# Patient Record
Sex: FEMALE | Race: WHITE | NOT HISPANIC OR LATINO | Employment: FULL TIME | URBAN - NONMETROPOLITAN AREA
[De-identification: names, ages, dates, MRNs, and addresses within clinical notes are randomized per-mention and may not be internally consistent; named-entity substitution may affect disease eponyms.]

---

## 2018-01-31 ENCOUNTER — HOSPITAL ENCOUNTER (EMERGENCY)
Facility: HOSPITAL | Age: 28
Discharge: HOME | End: 2018-01-31
Attending: STUDENT IN AN ORGANIZED HEALTH CARE EDUCATION/TRAINING PROGRAM | Admitting: STUDENT IN AN ORGANIZED HEALTH CARE EDUCATION/TRAINING PROGRAM

## 2018-01-31 VITALS
TEMPERATURE: 98.9 F | DIASTOLIC BLOOD PRESSURE: 80 MMHG | RESPIRATION RATE: 18 BRPM | HEIGHT: 64 IN | BODY MASS INDEX: 26.12 KG/M2 | WEIGHT: 153 LBS | SYSTOLIC BLOOD PRESSURE: 118 MMHG | HEART RATE: 91 BPM | OXYGEN SATURATION: 99 %

## 2018-01-31 DIAGNOSIS — K11.20 SALIVARY GLAND INFLAMMATION: Primary | ICD-10-CM

## 2018-01-31 PROCEDURE — 99283 EMERGENCY DEPT VISIT LOW MDM: CPT

## 2018-01-31 RX ORDER — CLINDAMYCIN HYDROCHLORIDE 300 MG/1
300 CAPSULE ORAL 3 TIMES DAILY
Qty: 21 CAPSULE | Refills: 0 | Status: SHIPPED | OUTPATIENT
Start: 2018-01-31 | End: 2019-06-12

## 2018-01-31 RX ORDER — ACETAMINOPHEN 325 MG/1
650 TABLET ORAL ONCE
Status: COMPLETED | OUTPATIENT
Start: 2018-01-31 | End: 2018-01-31

## 2018-01-31 RX ORDER — SENNOSIDES 8.6 MG
650 CAPSULE ORAL EVERY 8 HOURS PRN
Qty: 12 TABLET | Refills: 0 | Status: SHIPPED | OUTPATIENT
Start: 2018-01-31 | End: 2019-06-12

## 2018-01-31 RX ADMIN — ACETAMINOPHEN 650 MG: 325 TABLET, FILM COATED ORAL at 12:45

## 2019-06-12 ENCOUNTER — OFFICE VISIT (OUTPATIENT)
Dept: INTERNAL MEDICINE | Facility: CLINIC | Age: 29
End: 2019-06-12

## 2019-06-12 VITALS
RESPIRATION RATE: 17 BRPM | WEIGHT: 166 LBS | DIASTOLIC BLOOD PRESSURE: 72 MMHG | HEIGHT: 64 IN | SYSTOLIC BLOOD PRESSURE: 106 MMHG | TEMPERATURE: 97.7 F | BODY MASS INDEX: 28.34 KG/M2 | HEART RATE: 92 BPM | OXYGEN SATURATION: 98 %

## 2019-06-12 DIAGNOSIS — J02.9 SORE THROAT: ICD-10-CM

## 2019-06-12 DIAGNOSIS — J40 BRONCHITIS: Primary | ICD-10-CM

## 2019-06-12 DIAGNOSIS — J02.9 PHARYNGITIS, UNSPECIFIED ETIOLOGY: ICD-10-CM

## 2019-06-12 LAB
EXPIRATION DATE: NORMAL
INTERNAL CONTROL: NORMAL
Lab: NORMAL
S PYO RRNA THROAT QL PROBE: NEGATIVE

## 2019-06-12 PROCEDURE — 87651 STREP A DNA AMP PROBE: CPT | Performed by: PHYSICIAN ASSISTANT

## 2019-06-12 PROCEDURE — 99203 OFFICE O/P NEW LOW 30 MIN: CPT | Performed by: PHYSICIAN ASSISTANT

## 2019-06-12 RX ORDER — ETHINYL ESTRADIOL/DROSPIRENONE 0.02-3(28)
0.02 TABLET ORAL DAILY
COMMUNITY
Start: 2019-04-09 | End: 2021-03-08

## 2019-06-12 RX ORDER — DEXTROMETHORPHAN HYDROBROMIDE AND PROMETHAZINE HYDROCHLORIDE 15; 6.25 MG/5ML; MG/5ML
5 SYRUP ORAL NIGHTLY PRN
Qty: 118 ML | Refills: 0 | Status: SHIPPED | OUTPATIENT
Start: 2019-06-12 | End: 2021-03-08

## 2019-06-12 RX ORDER — CEFDINIR 300 MG/1
300 CAPSULE ORAL 2 TIMES DAILY
Qty: 20 CAPSULE | Refills: 0 | Status: SHIPPED | OUTPATIENT
Start: 2019-06-12 | End: 2019-06-22

## 2019-06-12 NOTE — PROGRESS NOTES
Chief Complaint   Patient presents with   • Cough     patient states symptoms started 3 days ago with sore throat, patient then states she began having a dry cough and drainage yesterday 06/11/2019   • Establish Care       Subjective   Uyen Howell is a 28 y.o. female presents today to establish care.  She has c/o cough and sore throat.    History of Present Illness     Patient reports that symptoms began a several days ago with sore throat.  The symptoms worsened last night and she developed cough that is dry with associated post nasal drainage.  She denies any fever, chills.  She has had some body aches.  She has tightness in her chest and reports that it is difficult to take a deep breath.  Denies any shortness of breath or chest pain.  She has been using over the counter cold medication as well as salt water gargles for the sore throat.  She feels that her symptoms are worsening.  They have caused sleep disturbance.    Past Medical History:   Diagnosis Date   • Allergic      Past Surgical History:   Procedure Laterality Date   • EYE SURGERY     • SALIVARY GLAND SURGERY  2018    Patient states it was a surgery to remove salivary stones.     Family History   Problem Relation Age of Onset   • Other Mother    • Diabetes Father    • Arthritis Maternal Grandmother    • Cancer Maternal Grandfather    • Stroke Maternal Grandfather    • Other Paternal Grandmother    • Arthritis Paternal Grandmother    • Diabetes Paternal Grandfather    • Heart disease Paternal Grandfather      Social History     Socioeconomic History   • Marital status: Single     Spouse name: Not on file   • Number of children: Not on file   • Years of education: Not on file   • Highest education level: Not on file   Tobacco Use   • Smoking status: Former Smoker     Packs/day: 0.00     Types: Cigarettes     Last attempt to quit: 6/9/2019   • Smokeless tobacco: Never Used   Substance and Sexual Activity   • Alcohol use: No   • Drug use: No   • Sexual  activity: Yes     Allergies   Allergen Reactions   • Biaxin [Clarithromycin] Swelling   • Amoxicillin Rash     Review of Systems   Constitutional: Positive for appetite change and fatigue. Negative for chills and fever.   HENT: Positive for congestion, postnasal drip, rhinorrhea, sore throat and voice change. Negative for ear pain, sinus pressure and sneezing.    Respiratory: Positive for cough and chest tightness. Negative for shortness of breath.    Cardiovascular: Negative for chest pain.   Gastrointestinal: Positive for nausea. Negative for abdominal pain, diarrhea and vomiting.   Musculoskeletal: Positive for arthralgias.     Objective     Vitals:    06/12/19 0906   BP: 106/72   Pulse: 92   Resp: 17   Temp: 97.7 °F (36.5 °C)   SpO2: 98%       Physical Exam   Constitutional: She is oriented to person, place, and time. She appears well-developed and well-nourished. No distress.   HENT:   Head: Normocephalic and atraumatic.   Right Ear: External ear and ear canal normal. Tympanic membrane is not erythematous, not retracted and not bulging. A middle ear effusion is present.   Left Ear: External ear and ear canal normal. Tympanic membrane is not erythematous, not retracted and not bulging. A middle ear effusion is present.   Nose: Congestion present. Right sinus exhibits no maxillary sinus tenderness and no frontal sinus tenderness. Left sinus exhibits no maxillary sinus tenderness and no frontal sinus tenderness.   Mouth/Throat: Uvula is midline. Posterior oropharyngeal erythema present.   Clear mild bilateral effusion noted, no erythema.   White nodule noted to the right of the posterior oropharynx.    Eyes: Conjunctivae and EOM are normal. Pupils are equal, round, and reactive to light.   Neck: Normal range of motion. Neck supple.   Cardiovascular: Normal rate, regular rhythm and normal heart sounds. Exam reveals no gallop and no friction rub.   No murmur heard.  Pulmonary/Chest: Effort normal and breath sounds  normal. No respiratory distress. She has no wheezes. She has no rales.   Neurological: She is alert and oriented to person, place, and time.   Skin: Skin is warm and dry. Capillary refill takes less than 2 seconds. She is not diaphoretic.   Psychiatric: She has a normal mood and affect. Her behavior is normal.   Nursing note and vitals reviewed.      Results for orders placed or performed in visit on 06/12/19   POCT Strep A, molecular   Result Value Ref Range    POC Strep A, Molecular Negative Negative    Internal Control Passed Passed    Lot Number [id number]     Expiration Date 03-        Assessment/Plan     Uyen was seen today for cough and establish care.    Diagnoses and all orders for this visit:    Bronchitis  -     cefdinir (OMNICEF) 300 MG capsule; Take 1 capsule by mouth 2 (Two) Times a Day for 10 days.  -     promethazine-dextromethorphan (PROMETHAZINE-DM) 6.25-15 MG/5ML syrup; Take 5 mL by mouth At Night As Needed for Cough.    Sore throat  -     POCT Strep A, molecular    Pharyngitis, unspecified etiology    Strep negative.  Start cefdinir.  Discontinue phenylephrine.  Start zyrtec/flonase for mid ear effusion.  Cough syrup as needed at night.  Can rotate tylenol/motrin for additional symptom relief.  Continue to use salt water gargles, push fluids, and rest.  White nodule to oropharynx most likely tonsil stone.  Have advised patient that if this persists to RTC.  Patient reports that she has ENT doctor and will make an appointment with them if the nodule is persistent.     Return if symptoms worsen or fail to improve.    Erin Guerrero PA-C

## 2019-06-18 ENCOUNTER — PROCEDURE VISIT (OUTPATIENT)
Dept: OBSTETRICS AND GYNECOLOGY | Facility: CLINIC | Age: 29
End: 2019-06-18

## 2019-06-18 VITALS
BODY MASS INDEX: 28.89 KG/M2 | WEIGHT: 169.2 LBS | DIASTOLIC BLOOD PRESSURE: 70 MMHG | SYSTOLIC BLOOD PRESSURE: 106 MMHG | HEIGHT: 64 IN

## 2019-06-18 DIAGNOSIS — Z12.4 SCREENING FOR MALIGNANT NEOPLASM OF CERVIX: ICD-10-CM

## 2019-06-18 DIAGNOSIS — Z01.419 ENCOUNTER FOR GYNECOLOGICAL EXAMINATION WITHOUT ABNORMAL FINDING: Primary | ICD-10-CM

## 2019-06-18 PROCEDURE — 99395 PREV VISIT EST AGE 18-39: CPT | Performed by: PHYSICIAN ASSISTANT

## 2019-06-18 NOTE — PROGRESS NOTES
Subjective   Chief Complaint   Patient presents with   • Gynecologic Exam     Last pap done in 2016, No complaints       Uyen Howell is a 28 y.o. year old  presenting to be seen for her annual gynecological exam.   She has no complaints  She has been on Gissell oc's for several years and desires to stop oc's to see if hormones contributing to depression symptoms. States she will use condoms  Patient's last menstrual period was 2019 (exact date).       Past Medical History:   Diagnosis Date   • Allergic    • Anxiety    • Asthma    • Chlamydia    • Depression    • Migraine    • Urinary tract infection         Current Outpatient Medications:   •  JOHN 3-0.02 MG per tablet, Take 0.02 mg by mouth Daily., Disp: , Rfl:   •  cefdinir (OMNICEF) 300 MG capsule, Take 1 capsule by mouth 2 (Two) Times a Day for 10 days., Disp: 20 capsule, Rfl: 0  •  promethazine-dextromethorphan (PROMETHAZINE-DM) 6.25-15 MG/5ML syrup, Take 5 mL by mouth At Night As Needed for Cough., Disp: 118 mL, Rfl: 0   Allergies   Allergen Reactions   • Biaxin [Clarithromycin] Swelling   • Amoxicillin Rash      Past Surgical History:   Procedure Laterality Date   • EYE SURGERY     • SALIVARY GLAND SURGERY  2018    Patient states it was a surgery to remove salivary stones.      Social History     Socioeconomic History   • Marital status: Single     Spouse name: Not on file   • Number of children: Not on file   • Years of education: Not on file   • Highest education level: Not on file   Tobacco Use   • Smoking status: Former Smoker     Packs/day: 0.00     Types: Cigarettes     Last attempt to quit: 2019     Years since quittin.0   • Smokeless tobacco: Never Used   Substance and Sexual Activity   • Alcohol use: No   • Drug use: No   • Sexual activity: Yes     Partners: Male     Birth control/protection: OCP      Family History   Problem Relation Age of Onset   • Other Mother    • Diabetes Father    • Arthritis Maternal Grandmother    • Cancer  "Maternal Grandfather    • Stroke Maternal Grandfather    • Other Paternal Grandmother    • Arthritis Paternal Grandmother    • Diabetes Paternal Grandfather    • Heart disease Paternal Grandfather        Review of Systems   Constitutional:        Weight gain   Gastrointestinal: Negative.    Genitourinary: Negative.    Hematological: Bruises/bleeds easily.   Psychiatric/Behavioral: Positive for dysphoric mood.   All other systems reviewed and are negative.          Objective   /70   Ht 162.6 cm (64\")   Wt 76.7 kg (169 lb 3.2 oz)   LMP 06/04/2019 (Exact Date)   Breastfeeding? No   BMI 29.04 kg/m²     Physical Exam   Constitutional: She appears well-developed and well-nourished. She is cooperative. No distress.   Pulmonary/Chest: Right breast exhibits no inverted nipple, no mass, no nipple discharge, no skin change and no tenderness. Left breast exhibits no inverted nipple, no mass, no nipple discharge, no skin change and no tenderness.   Abdominal: Soft. Normal appearance. There is no tenderness.   Genitourinary: Vagina normal and uterus normal. There is no tenderness or lesion on the right labia. There is no tenderness or lesion on the left labia. Cervix exhibits no motion tenderness and no discharge. Right adnexum displays no mass and no tenderness. Left adnexum displays no mass and no tenderness.   Genitourinary Comments: Pap done   Neurological: She is alert.   Skin: Skin is warm and dry.   Psychiatric: She has a normal mood and affect. Her behavior is normal.            Assessment and Plan  Uyen was seen today for gynecologic exam.    Diagnoses and all orders for this visit:    Encounter for gynecological examination without abnormal finding    Screening for malignant neoplasm of cervix  -     Liquid-based Pap Smear, Screening; Future      Patient Instructions   Encourage self breast exam monthly  Regular excercise             This note was electronically signed.    Antonina Laughlin PA-C   June 18, " 2019

## 2019-06-25 DIAGNOSIS — Z12.4 SCREENING FOR MALIGNANT NEOPLASM OF CERVIX: ICD-10-CM

## 2019-06-25 RX ORDER — FLUCONAZOLE 150 MG/1
TABLET ORAL
Qty: 2 TABLET | Refills: 0 | Status: SHIPPED | OUTPATIENT
Start: 2019-06-25 | End: 2021-03-08

## 2021-03-08 ENCOUNTER — OFFICE VISIT (OUTPATIENT)
Dept: INTERNAL MEDICINE | Facility: CLINIC | Age: 31
End: 2021-03-08

## 2021-03-08 VITALS
HEART RATE: 104 BPM | OXYGEN SATURATION: 99 % | TEMPERATURE: 99.6 F | SYSTOLIC BLOOD PRESSURE: 110 MMHG | DIASTOLIC BLOOD PRESSURE: 70 MMHG | HEIGHT: 64 IN | BODY MASS INDEX: 33.29 KG/M2 | WEIGHT: 195 LBS

## 2021-03-08 DIAGNOSIS — B07.0 PLANTAR WART OF RIGHT FOOT: ICD-10-CM

## 2021-03-08 DIAGNOSIS — Z13.39 ADHD (ATTENTION DEFICIT HYPERACTIVITY DISORDER) EVALUATION: ICD-10-CM

## 2021-03-08 DIAGNOSIS — Z11.59 ENCOUNTER FOR HEPATITIS C SCREENING TEST FOR LOW RISK PATIENT: ICD-10-CM

## 2021-03-08 DIAGNOSIS — Z76.89 ENCOUNTER TO ESTABLISH CARE: Primary | ICD-10-CM

## 2021-03-08 DIAGNOSIS — Z13.0 SCREENING FOR ENDOCRINE/METABOLIC/IMMUNITY DISORDERS: ICD-10-CM

## 2021-03-08 DIAGNOSIS — Z13.29 SCREENING FOR ENDOCRINE/METABOLIC/IMMUNITY DISORDERS: ICD-10-CM

## 2021-03-08 DIAGNOSIS — Z13.228 SCREENING FOR ENDOCRINE/METABOLIC/IMMUNITY DISORDERS: ICD-10-CM

## 2021-03-08 PROCEDURE — 99203 OFFICE O/P NEW LOW 30 MIN: CPT | Performed by: NURSE PRACTITIONER

## 2021-03-08 NOTE — PROGRESS NOTES
"Date: 2021    Name: Uyen Howell  : 1990    Chief Complaint:   Chief Complaint   Patient presents with   • ADHD     pt is wanting to discuss medication, wart on bottom right foot       HPI:  Uyen Howell is a 30 y.o. female presents to [facility].  She has complaints of a lesion on the bottom of her right foot that has been present for over a year. She has using OTC solution that is brushed on warts for the past week.  Lesion is not improved.  It is becoming painful when she walks.  Does not recall injury to foot.  No swelling, bleeding, itching, drainage from lesion.    She is concerned she may have ADHD. Unable to pay attention for long periods of time, unable to focus.  Has worsened over the past year.  She is going to Law School at .  Unable to finish projects, procrastinates and then finishes under pressure at the last moment.  She feels she cannot sit still.  Always shifting, rocking, always has something in her hands to play with.  Mom has ADHD, takes medication for it.  She finds her self impatiently waiting for others to finish her sentences. Occasionally has difficulty organizing tasks/activities.  Is easily distracted.      LMP: 21  Last pap: 2019  Abnormal pap: no  : no  Para: no  Menarche: 5 th grade    Supplements: no        History: The following portions of the patient's history were reviewed and updated as appropriate: allergies, current medications, past medical history, family history, surgical history, social history and problem list.      ROS:  Review of Systems   All other systems reviewed and are negative.      VS:  Vitals:    21 1658   BP: 110/70   Pulse: 104   Temp: 99.6 °F (37.6 °C)   TempSrc: Infrared   SpO2: 99%   Weight: 88.5 kg (195 lb)   Height: 162.6 cm (64\")     Body mass index is 33.47 kg/m².  PE:  Physical Exam  Constitutional:       Appearance: She is not ill-appearing.   HENT:      Head: Normocephalic.      Right Ear: External ear normal.      " Left Ear: External ear normal.   Eyes:      Conjunctiva/sclera: Conjunctivae normal.      Pupils: Pupils are equal, round, and reactive to light.   Cardiovascular:      Rate and Rhythm: Normal rate and regular rhythm.      Pulses: Normal pulses.      Heart sounds: Normal heart sounds.   Pulmonary:      Effort: Pulmonary effort is normal.      Breath sounds: Normal breath sounds.   Musculoskeletal:      Cervical back: Normal range of motion and neck supple.      Comments: Plantar wart on plantar surface, right foot.  Slightly tender to touch.  Skin surrounding wart is hypopigmented, dry.     Skin:     General: Skin is warm.      Capillary Refill: Capillary refill takes less than 2 seconds.   Neurological:      Mental Status: She is alert and oriented to person, place, and time.      Coordination: Coordination normal.      Gait: Gait normal.   Psychiatric:         Attention and Perception: Attention normal.         Mood and Affect: Mood is anxious.         Speech: Speech normal.         Behavior: Behavior normal.         Thought Content: Thought content normal.         Assessment/Plan:  Diagnoses and all orders for this visit:    1. Encounter to establish care (Primary)    2. Plantar wart of right foot  -     Ambulatory Referral to Podiatry  - Advised to stop using OTC treatment  - Wear shoes with good support, cushioned sole    3. ADHD (attention deficit hyperactivity disorder) evaluation  -     Ambulatory Referral to Psychiatry    4. Encounter for hepatitis C screening test for low risk patient  -     Hepatitis C Antibody    5. Screening for endocrine/metabolic/immunity disorders  -     Comprehensive Metabolic Panel  -     TSH    Return for Annual.

## 2021-03-15 ENCOUNTER — OFFICE VISIT (OUTPATIENT)
Dept: INTERNAL MEDICINE | Facility: CLINIC | Age: 31
End: 2021-03-15

## 2021-03-15 VITALS
DIASTOLIC BLOOD PRESSURE: 72 MMHG | BODY MASS INDEX: 33.97 KG/M2 | OXYGEN SATURATION: 99 % | SYSTOLIC BLOOD PRESSURE: 115 MMHG | HEIGHT: 64 IN | WEIGHT: 199 LBS | HEART RATE: 77 BPM | TEMPERATURE: 98.7 F | RESPIRATION RATE: 16 BRPM

## 2021-03-15 DIAGNOSIS — R41.840 ATTENTION DEFICIT: Primary | ICD-10-CM

## 2021-03-15 DIAGNOSIS — Z79.899 HIGH RISK MEDICATION USE: ICD-10-CM

## 2021-03-15 PROCEDURE — 99214 OFFICE O/P EST MOD 30 MIN: CPT | Performed by: FAMILY MEDICINE

## 2021-03-15 RX ORDER — METHYLPHENIDATE HYDROCHLORIDE 18 MG/1
18 TABLET ORAL EVERY MORNING
Qty: 30 TABLET | Refills: 0 | Status: SHIPPED | OUTPATIENT
Start: 2021-03-15 | End: 2021-04-12

## 2021-03-15 NOTE — PROGRESS NOTES
"Subjective    Uyen Howell is a 30 y.o. female here for:  Chief Complaint   Patient presents with   • Mental Health Problem     has trouble focusing, concentrating on things.  Mom has Adult ADHD and thinks she may have.  Therapist also recommends.       History per MA reviewed.    Per office note 3/8/21:    \"She is concerned she may have ADHD. Unable to pay attention for long periods of time, unable to focus.  Has worsened over the past year.  She is going to Law School at .  Unable to finish projects, procrastinates and then finishes under pressure at the last moment.  She feels she cannot sit still.  Always shifting, rocking, always has something in her hands to play with.  Mom has ADHD, takes medication for it.  She finds her self impatiently waiting for others to finish her sentences. Occasionally has difficulty organizing tasks/activities.  Is easily distracted.\"    In first year of law school. Mom takes Concerta and it works well for her. No known heart issues in patient.          The following portions of the patient's history were reviewed and updated as appropriate: allergies, current medications, past family history, past medical history, past social history, past surgical history and problem list.    Review of Systems   Constitutional: Negative for fever.   Cardiovascular: Negative for chest pain and palpitations.   Psychiatric/Behavioral: Positive for decreased concentration and positive for hyperactivity.       Visit Vitals  /72   Pulse 77   Temp 98.7 °F (37.1 °C)   Resp 16   Ht 162.6 cm (64\")   Wt 90.3 kg (199 lb)   SpO2 99%   BMI 34.16 kg/m²         Objective   Physical Exam  Vitals and nursing note reviewed.   Constitutional:       General: She is not in acute distress.     Appearance: Normal appearance. She is well-developed and well-groomed. She is obese. She is not ill-appearing, toxic-appearing or diaphoretic.      Interventions: Face mask in place.   HENT:      Head: Normocephalic and " atraumatic.      Right Ear: Hearing normal.      Left Ear: Hearing normal.   Eyes:      General: Lids are normal. No scleral icterus.     Extraocular Movements: Extraocular movements intact.   Neck:      Trachea: Phonation normal.   Cardiovascular:      Rate and Rhythm: Normal rate and regular rhythm.   Pulmonary:      Effort: Pulmonary effort is normal.   Skin:     Coloration: Skin is not jaundiced.   Neurological:      General: No focal deficit present.      Mental Status: She is alert and oriented to person, place, and time.      Motor: Motor function is intact.   Psychiatric:         Attention and Perception: Attention and perception normal.         Mood and Affect: Mood and affect normal.         Speech: Speech normal.         Behavior: Behavior is hyperactive. Behavior is cooperative.         Thought Content: Thought content normal.         Cognition and Memory: Cognition and memory normal.         Judgment: Judgment normal.           Assessment/Plan     Problem List Items Addressed This Visit     None      Visit Diagnoses     Attention deficit    -  Primary    Relevant Medications    methylphenidate (Concerta) 18 MG CR tablet    Other Relevant Orders    Compliance Drug Analysis, Ur - Urine, Clean Catch    High risk medication use        Relevant Orders    Compliance Drug Analysis, Ur - Urine, Clean Catch          · KARLEE reviewed and appropriate.  Reviewed adult ADHD screener (see scanned media), supportive of add/adhd diagnosis. Continue seeing therapist. Trial of Concerta. If insurance does not cover may try ritalin or Adderall. Follow up in a month. Provided copy of drug contract for review. UDS obtained today. No red flags. Sounds as though she could really benefit from therapy.    Return in about 4 weeks (around 4/12/2021) for Controlled Rx Follow Up.     Rosa M Cornelius MD

## 2021-03-19 ENCOUNTER — TELEPHONE (OUTPATIENT)
Dept: INTERNAL MEDICINE | Facility: CLINIC | Age: 31
End: 2021-03-19

## 2021-03-19 LAB — DRUGS UR: NORMAL

## 2021-03-19 NOTE — TELEPHONE ENCOUNTER
PATIENT STATES THAT HER INSURANCE COMPANY NEEDS A PA FOR [employer].  SHE CAN BE REACHED [phone]

## 2021-03-22 NOTE — TELEPHONE ENCOUNTER
Mar from [employer] called and stated she is working on the PA for Concerta. The brand name is preferred and the generic is non-formulary. Advised the brand name is ok  for this pt. She will process the PA for the brand name and fax a determination letter today.

## 2021-03-23 ENCOUNTER — PRIOR AUTHORIZATION (OUTPATIENT)
Dept: INTERNAL MEDICINE | Facility: CLINIC | Age: 31
End: 2021-03-23

## 2021-04-12 ENCOUNTER — OFFICE VISIT (OUTPATIENT)
Dept: INTERNAL MEDICINE | Facility: CLINIC | Age: 31
End: 2021-04-12

## 2021-04-12 VITALS
TEMPERATURE: 97.7 F | HEIGHT: 64 IN | SYSTOLIC BLOOD PRESSURE: 120 MMHG | DIASTOLIC BLOOD PRESSURE: 60 MMHG | BODY MASS INDEX: 33.8 KG/M2 | RESPIRATION RATE: 18 BRPM | WEIGHT: 198 LBS | HEART RATE: 92 BPM | OXYGEN SATURATION: 96 %

## 2021-04-12 DIAGNOSIS — R41.840 ATTENTION DEFICIT: Primary | ICD-10-CM

## 2021-04-12 PROCEDURE — 99213 OFFICE O/P EST LOW 20 MIN: CPT | Performed by: FAMILY MEDICINE

## 2021-04-12 RX ORDER — METHYLPHENIDATE HYDROCHLORIDE 27 MG/1
27 TABLET ORAL EVERY MORNING
Qty: 30 TABLET | Refills: 0 | Status: SHIPPED | OUTPATIENT
Start: 2021-04-12 | End: 2021-05-10

## 2021-04-12 NOTE — PROGRESS NOTES
"Subjective    Uyen Howell is a 30 y.o. female here for:  Chief Complaint   Patient presents with   • ADD     Pt states she has not really seen a difference in her attention since starting the concerta.        History per MA reviewed.    No side effects other than mild tachycardia first day  Doesn't feel it has helped much thinks dose increase may work         The following portions of the patient's history were reviewed and updated as appropriate: allergies, current medications, past family history, past medical history, past social history, past surgical history and problem list.    Review of Systems   Constitutional: Negative for fever.   Cardiovascular: Negative for chest pain and palpitations.   Psychiatric/Behavioral: Positive for decreased concentration.       Visit Vitals  /60   Pulse 92   Temp 97.7 °F (36.5 °C) (Temporal)   Resp 18   Ht 162.6 cm (64.02\")   Wt 89.8 kg (198 lb)   SpO2 96%   BMI 33.97 kg/m²         Objective   Physical Exam  Vitals and nursing note reviewed.   Constitutional:       General: She is not in acute distress.     Appearance: Normal appearance. She is well-developed and well-groomed. She is not ill-appearing, toxic-appearing or diaphoretic.      Interventions: Face mask in place.   HENT:      Head: Normocephalic and atraumatic.      Right Ear: Hearing normal.      Left Ear: Hearing normal.   Eyes:      General: Lids are normal. No scleral icterus.     Extraocular Movements: Extraocular movements intact.   Neck:      Trachea: Phonation normal.   Cardiovascular:      Rate and Rhythm: Normal rate and regular rhythm.      Heart sounds: Normal heart sounds.   Pulmonary:      Effort: Pulmonary effort is normal.   Skin:     Coloration: Skin is not jaundiced.   Neurological:      General: No focal deficit present.      Mental Status: She is alert and oriented to person, place, and time.      Motor: Motor function is intact.   Psychiatric:         Attention and Perception: Attention and " perception normal.         Mood and Affect: Mood and affect normal.         Speech: Speech normal.         Behavior: Behavior normal. Behavior is cooperative.         Thought Content: Thought content normal.         Cognition and Memory: Cognition and memory normal.         Judgment: Judgment normal.         For medical decision making review of the following was required:  Compliance Drug Analysis, Ur - Urine, Clean Catch (03/15/2021 15:25)      Assessment/Plan     Problem List Items Addressed This Visit     None      Visit Diagnoses     Attention deficit    -  Primary    Relevant Medications    methylphenidate (Concerta) 27 MG CR tablet          · Drug contract signed today, UDS last visit appropriate. Trying higher dose, if 27 mg doesn't seem to help can go up to 36 mg after couple of weeks (use remaining 18 mg doses). Notify office at least 48 hrs ahead of time regarding refill need, and needs to let me know which dose to send (27 mg or 36 mg). KARLEE reviewed and appropriate.      Return in about 3 months (around 7/12/2021) for Controlled Rx Follow Up, telehealth okay.     Rosa M Cornelius MD

## 2021-04-15 ENCOUNTER — TELEPHONE (OUTPATIENT)
Dept: INTERNAL MEDICINE | Facility: CLINIC | Age: 31
End: 2021-04-15

## 2021-04-15 NOTE — TELEPHONE ENCOUNTER
Caller: Uyen Howell    Relationship: Self    Best call back number: [phone]    What form or medical record are you requesting: PROOF OF DIAGNOSIS OF ADHD  Who is requesting this form or medical record from you: PATIENT    How would you like to receive the form or medical records (pick-up, mail, fax): PATIENT WILL     Timeframe paperwork needed: A.S.A.P

## 2021-04-15 NOTE — TELEPHONE ENCOUNTER
Did not realize pt had already been communicating with you and Dr. Cornelius, I put a letter in for this only, but she said more info was needed, there is a mychart message in that I have sent to Dr. Cornelius

## 2021-05-10 ENCOUNTER — PATIENT MESSAGE (OUTPATIENT)
Dept: INTERNAL MEDICINE | Facility: CLINIC | Age: 31
End: 2021-05-10

## 2021-05-10 DIAGNOSIS — R41.840 ATTENTION DEFICIT: Primary | ICD-10-CM

## 2021-05-10 RX ORDER — METHYLPHENIDATE HYDROCHLORIDE 36 MG/1
36 TABLET ORAL EVERY MORNING
Qty: 30 TABLET | Refills: 0 | Status: SHIPPED | OUTPATIENT
Start: 2021-05-10 | End: 2021-07-12

## 2021-05-10 NOTE — TELEPHONE ENCOUNTER
From: Uyen Howell  To: Rosa M Cornelius MD  Sent: 5/10/2021 11:22 AM EDT  Subject: Prescription Question    Hello Doctor Ashli,     I would like my next refill to be the higher mg we talked about, please. Thank you!     Sincerely,   Uyen Howell

## 2021-05-31 ENCOUNTER — PATIENT MESSAGE (OUTPATIENT)
Dept: INTERNAL MEDICINE | Facility: CLINIC | Age: 31
End: 2021-05-31

## 2021-05-31 DIAGNOSIS — R41.840 ATTENTION DEFICIT: Primary | ICD-10-CM

## 2021-06-02 RX ORDER — DEXTROAMPHETAMINE SACCHARATE, AMPHETAMINE ASPARTATE, DEXTROAMPHETAMINE SULFATE AND AMPHETAMINE SULFATE 2.5; 2.5; 2.5; 2.5 MG/1; MG/1; MG/1; MG/1
10 TABLET ORAL 2 TIMES DAILY
Qty: 60 TABLET | Refills: 0 | Status: SHIPPED | OUTPATIENT
Start: 2021-06-02 | End: 2021-07-08 | Stop reason: SDUPTHER

## 2021-06-02 NOTE — TELEPHONE ENCOUNTER
From: Uyen Howell  To: Rosa M Cornelius MD  Sent: 5/31/2021 7:35 PM EDT  Subject: Prescription Question    Hello Dr. Cornelius,     I am still not feeling like Concerta is having the effect I am needing. Over this holiday weekend I spoke with my brother, and found out he too has ADHD. He takes Adderall and said it really helps him.   Could I possibly try that medication instead?     Thank you,   Uyen Howell

## 2021-06-03 ENCOUNTER — PRIOR AUTHORIZATION (OUTPATIENT)
Dept: INTERNAL MEDICINE | Facility: CLINIC | Age: 31
End: 2021-06-03

## 2021-07-08 ENCOUNTER — PATIENT MESSAGE (OUTPATIENT)
Dept: INTERNAL MEDICINE | Facility: CLINIC | Age: 31
End: 2021-07-08

## 2021-07-08 DIAGNOSIS — R41.840 ATTENTION DEFICIT: ICD-10-CM

## 2021-07-08 RX ORDER — DEXTROAMPHETAMINE SACCHARATE, AMPHETAMINE ASPARTATE, DEXTROAMPHETAMINE SULFATE AND AMPHETAMINE SULFATE 2.5; 2.5; 2.5; 2.5 MG/1; MG/1; MG/1; MG/1
10 TABLET ORAL 2 TIMES DAILY
Qty: 60 TABLET | Refills: 0 | Status: SHIPPED | OUTPATIENT
Start: 2021-07-08 | End: 2021-08-16 | Stop reason: SDUPTHER

## 2021-07-12 ENCOUNTER — TELEMEDICINE (OUTPATIENT)
Dept: INTERNAL MEDICINE | Facility: CLINIC | Age: 31
End: 2021-07-12

## 2021-07-12 VITALS — WEIGHT: 179 LBS | BODY MASS INDEX: 30.71 KG/M2

## 2021-07-12 DIAGNOSIS — R41.840 ATTENTION DEFICIT: Primary | ICD-10-CM

## 2021-07-12 PROCEDURE — 99213 OFFICE O/P EST LOW 20 MIN: CPT | Performed by: FAMILY MEDICINE

## 2021-07-12 NOTE — PATIENT INSTRUCTIONS
Living With Attention Deficit Hyperactivity Disorder  If you have been diagnosed with attention deficit hyperactivity disorder (ADHD), you may be relieved that you now know why you have felt or behaved a certain way. Still, you may feel overwhelmed about the treatment ahead. You may also wonder how to get the support you need and how to deal with the condition day-to-day. With treatment and support, you can live with ADHD and manage your symptoms.  How to manage lifestyle changes  Managing stress  Stress is your body's reaction to life changes and events, both good and bad. To cope with the stress of an ADHD diagnosis, it may help to:  · Learn more about ADHD.  · Exercise regularly. Even a short daily walk can lower stress levels.  · Participate in training or education programs (including social skills training classes) that teach you to deal with symptoms.    Medicines  Your health care provider may suggest certain medicines if he or she feels that they will help to improve your condition. Stimulant medicines are usually prescribed to treat ADHD, and therapy may also be prescribed. It is important to:  · Avoid using alcohol and other substances that may prevent your medicines from working properly (may interact).   · Talk with your pharmacist or health care provider about all the medicines that you take, their possible side effects, and what medicines are safe to take together.  · Make it your goal to take part in all treatment decisions (shared decision-making). Ask about possible side effects of medicines that your health care provider recommends, and tell him or her how you feel about having those side effects. It is best if shared decision-making with your health care provider is part of your total treatment plan.  Relationships  To strengthen your relationships with family members while treating your condition, consider taking part in family therapy. You might also attend self-help groups alone or with a  loved one.  Be honest about how your symptoms affect your relationships. Make an effort to communicate respectfully instead of fighting, and find ways to show others that you care. Psychotherapy may be useful in helping you cope with how ADHD affects your relationships.  How to recognize changes in your condition  The following signs may mean that your treatment is working well and your condition is improving:  · Consistently being on time for appointments.  · Being more organized at home and work.  · Other people noticing improvements in your behavior.  · Achieving goals that you set for yourself.  · Thinking more clearly.  The following signs may mean that your treatment is not working very well:  · Feeling impatience or more confusion.  · Missing, forgetting, or being late for appointments.  · An increasing sense of disorganization and messiness.  · More difficulty in reaching goals that you set for yourself.  · Loved ones becoming angry or frustrated with you.  Where to find support  Talking to others  · Keep emotion out of important discussions and speak in a calm, logical way.  · Listen closely and patiently to your loved ones. Try to understand their point of view, and try to avoid getting defensive.  · Take responsibility for the consequences of your actions.  · Ask that others do not take your behaviors personally.  · Aim to solve problems as they come up, and express your feelings instead of bottling them up.  · Talk openly about what you need from your loved ones and how they can support you.  · Consider going to family therapy sessions or having your family meet with a specialist who deals with ADHD-related behavior problems.  Finances  Not all insurance plans cover mental health care, so it is important to check with your insurance carrier. If paying for co-pays or counseling services is a problem, search for a [facility] or [facility] mental health care center. Public mental health care services may be offered  there at a low cost or no cost when you are not able to see a private health care provider.  If you are taking medicine for ADHD, you may be able to get the generic form, which may be less expensive than brand-name medicine. Some makers of prescription medicines also offer help to patients who cannot afford the medicines that they need.  Follow these instructions at home:  · Take over-the-counter and prescription medicines only as told by your health care provider. Check with your health care provider before taking any new medicines.  · Create structure and an organized atmosphere at home. For example:  ? Make a list of tasks, then rank them from most important to least important. Work on one task at a time until your listed tasks are done.  ? Make a daily schedule and follow it consistently every day.  ? Use an appointment calendar, and check it 2 or 3 times a day to keep on track. Keep it with you when you leave the house.  ? Create spaces where you keep certain things, and always put things back in their places after you use them.  · Keep all follow-up visits as told by your health care provider. This is important.  Questions to ask your health care provider:  · What are the risks and benefits of taking medicines?  · Would I benefit from therapy?  · How often should I follow up with a health care provider?  Contact a health care provider if:  · You have side effects from your medicines, such as:  ? Repeated muscle twitches, coughing, or speech outbursts.  ? Sleep problems.  ? Loss of appetite.  ? Depression.  ? New or worsening behavior problems.  ? Dizziness.  ? Unusually fast heartbeat.  ? Stomach pains.  ? Headaches.  Get help right away if:  · You have a severe reaction to a medicine.  · Your behavior suddenly gets worse.  Summary  · With treatment and support, you can live with ADHD and manage your symptoms.  · The medicines that are most often prescribed for ADHD are stimulants.  · Consider taking part in  family therapy or self-help groups with family members or friends.  · When you talk with friends and family about your ADHD, be patient and communicate openly.  · Take over-the-counter and prescription medicines only as told by your health care provider. Check with your health care provider before taking any new medicines.  This information is not intended to replace advice given to you by your health care provider. Make sure you discuss any questions you have with your health care provider.  Document Revised: 04/10/2020 Document Reviewed: 04/19/2018  Elsevier Patient Education © 2021 [employer]

## 2021-07-12 NOTE — PROGRESS NOTES
You have chosen to receive care through a telehealth visit.  Do you consent to use a video/audio connection for your medical care today? Yes    On this video visit is Erika MASON CMA, Dr. Cornelius, and Uyen Howell

## 2021-07-12 NOTE — PROGRESS NOTES
Subjective    Uyen Howell is a 30 y.o. female here for:  Chief Complaint   Patient presents with   • ADD     Pt states the Adderall seems to be working fine for her. Denies any problems or side effects.        Patient presents for a virtual visit. This visit was scheduled as a video visit to comply with patient safety concerns in accordance with CDC recommendations.     Previously tried Concerta and didn't seem effective  Likes Adderall a lot more  She feels more focused, especially with internship and school  Feels less groggy through day but not fidgeting   Only possible side effect is weight loss. Has had to get wedding dress altered multiple times.   Lost more when she first started concerta, more when sh started Adderall. Has stabilized.  Last weight a week ago was about 179 lb.          During today's visit, I reviewed the documented allergies, medications, chief complaint, and pertinent vitals.  I have confirmed with the patient that there have been no changes since this information was discussed with my clinical team member.    The following portions of the patient's history were reviewed and updated as appropriate: allergies, current medications, past family history, past medical history, past social history, past surgical history and problem list.    Review of Systems   Constitutional: Positive for appetite change and unexpected weight loss.   Psychiatric/Behavioral: Positive for sleep disturbance (took adderall too late one day after sleeping in--didn't get to sleep as well that night.).       Visit Vitals  Wt 81.2 kg (179 lb) Comment: per pt about a week ago   BMI 30.71 kg/m²         Objective   Nursing note reviewed.  General: healthy appearing, no distress.  HENT: no facial deformities, hearing is within normal limits   Eyes: EOM intact, no obvious nystagmus.  Neck: phonation normal. No stridor  Pulm: Normal work of breathing  Neuro: A&O x 3. No abnormal movements.  Skin: No rashes appreciated to face.  No visible cyanosis  Psych: Mood and affect appropriate. Insight normal. Judgement appropriate. Behavior normal. Memory normal.     Compliance Drug Analysis, Ur - Urine, Clean Catch (03/15/2021 15:25)      Assessment/Plan     Problem List Items Addressed This Visit        Neuro    Attention deficit - Primary          · KARLEE reviewed and appropriate.  Sounds to be doing much better with Adderall compared to Concerta. Weight has stabilized. Notify office 48 hrs ahead of needing refill.  Return in about 14 weeks (around 10/18/2021) for Controlled Rx Follow Up, telehealth okay.      Rosa M Cornelius MD

## 2021-08-16 ENCOUNTER — PATIENT MESSAGE (OUTPATIENT)
Dept: INTERNAL MEDICINE | Facility: CLINIC | Age: 31
End: 2021-08-16

## 2021-08-16 DIAGNOSIS — R41.840 ATTENTION DEFICIT: ICD-10-CM

## 2021-08-16 RX ORDER — DEXTROAMPHETAMINE SACCHARATE, AMPHETAMINE ASPARTATE, DEXTROAMPHETAMINE SULFATE AND AMPHETAMINE SULFATE 2.5; 2.5; 2.5; 2.5 MG/1; MG/1; MG/1; MG/1
10 TABLET ORAL 2 TIMES DAILY
Qty: 60 TABLET | Refills: 0 | Status: SHIPPED | OUTPATIENT
Start: 2021-08-16 | End: 2021-09-27 | Stop reason: SDUPTHER

## 2021-08-16 NOTE — TELEPHONE ENCOUNTER
From: Uyen Howell  To: Rosa M Cornelius MD  Sent: 8/16/2021 8:05 AM EDT  Subject: Prescription Question    [facility] Doctor Ashli,     I need a refill for my adderall please.     Thank you,  Uyen Howell

## 2021-09-26 ENCOUNTER — PATIENT MESSAGE (OUTPATIENT)
Dept: INTERNAL MEDICINE | Facility: CLINIC | Age: 31
End: 2021-09-26

## 2021-09-26 DIAGNOSIS — R41.840 ATTENTION DEFICIT: ICD-10-CM

## 2021-09-27 RX ORDER — DEXTROAMPHETAMINE SACCHARATE, AMPHETAMINE ASPARTATE, DEXTROAMPHETAMINE SULFATE AND AMPHETAMINE SULFATE 2.5; 2.5; 2.5; 2.5 MG/1; MG/1; MG/1; MG/1
10 TABLET ORAL 2 TIMES DAILY
Qty: 60 TABLET | Refills: 0 | Status: SHIPPED | OUTPATIENT
Start: 2021-09-27 | End: 2021-10-18 | Stop reason: DRUGHIGH

## 2021-09-27 NOTE — TELEPHONE ENCOUNTER
From: Uyen Howell  Sent: 9/27/2021 9:06 AM EDT  To: Bon[facility] De Oliveira [facility]  Subject: RE: Prescription Question    I need the adderall refilled please.     ----- Message -----  From: MARIANNE TORIBIO  Sent: 9/27/21, 8:51 AM  To: Uyen Howell  Subject: RE: Prescription Question    What medication do you need refilled?      ----- Message -----   From:Uyen Howell   Sent:9/26/2021 10:15 AM EDT   To:Rosa M Cornelius MD   Subject:Prescription Question    Julee Cornelius,     I am needing a refill on my medication.   Also, I know you said to make an appointment in October, but there weren't any available times I could make, so I set one up for November.     Thank you,   Uyen Howell

## 2021-10-18 ENCOUNTER — OFFICE VISIT (OUTPATIENT)
Dept: INTERNAL MEDICINE | Facility: CLINIC | Age: 31
End: 2021-10-18

## 2021-10-18 VITALS
OXYGEN SATURATION: 98 % | HEIGHT: 64 IN | HEART RATE: 76 BPM | RESPIRATION RATE: 16 BRPM | WEIGHT: 173 LBS | SYSTOLIC BLOOD PRESSURE: 110 MMHG | DIASTOLIC BLOOD PRESSURE: 65 MMHG | TEMPERATURE: 97.3 F | BODY MASS INDEX: 29.53 KG/M2

## 2021-10-18 DIAGNOSIS — R41.840 ATTENTION DEFICIT: Primary | ICD-10-CM

## 2021-10-18 PROCEDURE — 99213 OFFICE O/P EST LOW 20 MIN: CPT | Performed by: FAMILY MEDICINE

## 2021-10-18 RX ORDER — DEXTROAMPHETAMINE SACCHARATE, AMPHETAMINE ASPARTATE, DEXTROAMPHETAMINE SULFATE AND AMPHETAMINE SULFATE 3.75; 3.75; 3.75; 3.75 MG/1; MG/1; MG/1; MG/1
15 TABLET ORAL 2 TIMES DAILY
Qty: 60 TABLET | Refills: 0 | Status: SHIPPED | OUTPATIENT
Start: 2021-10-18 | End: 2021-11-30 | Stop reason: SDUPTHER

## 2021-10-18 NOTE — PATIENT INSTRUCTIONS
Living With Attention Deficit Hyperactivity Disorder  If you have been diagnosed with attention deficit hyperactivity disorder (ADHD), you may be relieved that you now know why you have felt or behaved a certain way. Still, you may feel overwhelmed about the treatment ahead. You may also wonder how to get the support you need and how to deal with the condition day-to-day. With treatment and support, you can live with ADHD and manage your symptoms.  How to manage lifestyle changes  Managing stress  Stress is your body's reaction to life changes and events, both good and bad. To cope with the stress of an ADHD diagnosis, it may help to:  · Learn more about ADHD.  · Exercise regularly. Even a short daily walk can lower stress levels.  · Participate in training or education programs (including social skills training classes) that teach you to deal with symptoms.    Medicines  Your health care provider may suggest certain medicines if he or she feels that they will help to improve your condition. Stimulant medicines are usually prescribed to treat ADHD, and therapy may also be prescribed. It is important to:  · Avoid using alcohol and other substances that may prevent your medicines from working properly (may interact).  · Talk with your pharmacist or health care provider about all the medicines that you take, their possible side effects, and what medicines are safe to take together.  · Make it your goal to take part in all treatment decisions (shared decision-making). Ask about possible side effects of medicines that your health care provider recommends, and tell him or her how you feel about having those side effects. It is best if shared decision-making with your health care provider is part of your total treatment plan.  Relationships  To strengthen your relationships with family members while treating your condition, consider taking part in family therapy. You might also attend self-help groups alone or with a loved  one.  Be honest about how your symptoms affect your relationships. Make an effort to communicate respectfully instead of fighting, and find ways to show others that you care. Psychotherapy may be useful in helping you cope with how ADHD affects your relationships.  How to recognize changes in your condition  The following signs may mean that your treatment is working well and your condition is improving:  · Consistently being on time for appointments.  · Being more organized at home and work.  · Other people noticing improvements in your behavior.  · Achieving goals that you set for yourself.  · Thinking more clearly.  The following signs may mean that your treatment is not working very well:  · Feeling impatience or more confusion.  · Missing, forgetting, or being late for appointments.  · An increasing sense of disorganization and messiness.  · More difficulty in reaching goals that you set for yourself.  · Loved ones becoming angry or frustrated with you.  Follow these instructions at home:  · Take over-the-counter and prescription medicines only as told by your health care provider. Check with your health care provider before taking any new medicines.  · Create structure and an organized atmosphere at home. For example:  ? Make a list of tasks, then rank them from most important to least important. Work on one task at a time until your listed tasks are done.  ? Make a daily schedule and follow it consistently every day.  ? Use an appointment calendar, and check it 2 or 3 times a day to keep on track. Keep it with you when you leave the house.  ? Create spaces where you keep certain things, and always put things back in their places after you use them.  · Keep all follow-up visits as told by your health care provider. This is important.  Where to find support  Talking to others    · Keep emotion out of important discussions and speak in a calm, logical way.  · Listen closely and patiently to your loved ones. Try  to understand their point of view, and try to avoid getting defensive.  · Take responsibility for the consequences of your actions.  · Ask that others do not take your behaviors personally.  · Aim to solve problems as they come up, and express your feelings instead of bottling them up.  · Talk openly about what you need from your loved ones and how they can support you.  · Consider going to family therapy sessions or having your family meet with a specialist who deals with ADHD-related behavior problems.    Finances  Not all insurance plans cover mental health care, so it is important to check with your insurance carrier. If paying for co-pays or counseling services is a problem, search for a [facility] or [facility] mental health care center. Public mental health care services may be offered there at a low cost or no cost when you are not able to see a private health care provider.  If you are taking medicine for ADHD, you may be able to get the generic form, which may be less expensive than brand-name medicine. Some makers of prescription medicines also offer help to patients who cannot afford the medicines that they need.  Questions to ask your health care provider:  · What are the risks and benefits of taking medicines?  · Would I benefit from therapy?  · How often should I follow up with a health care provider?  Contact a health care provider if:  · You have side effects from your medicines, such as:  ? Repeated muscle twitches, coughing, or speech outbursts.  ? Sleep problems.  ? Loss of appetite.  ? Depression.  ? New or worsening behavior problems.  ? Dizziness.  ? Unusually fast heartbeat.  ? Stomach pains.  ? Headaches.  Get help right away if:  · You have a severe reaction to a medicine.  · Your behavior suddenly gets worse.  Summary  · With treatment and support, you can live with ADHD and manage your symptoms.  · The medicines that are most often prescribed for ADHD are stimulants.  · Consider taking part in  family therapy or self-help groups with family members or friends.  · When you talk with friends and family about your ADHD, be patient and communicate openly.  · Take over-the-counter and prescription medicines only as told by your health care provider. Check with your health care provider before taking any new medicines.  This information is not intended to replace advice given to you by your health care provider. Make sure you discuss any questions you have with your health care provider.  Document Revised: 06/02/2021 Document Reviewed: 06/02/2021  Elsevier Patient Education © 2021 Elsevier Inc.       10:30

## 2021-10-18 NOTE — PROGRESS NOTES
"Subjective    Uyen Howell is a 31 y.o. female here for:  Chief Complaint   Patient presents with   • ADHD     pt would like to discuss adderall dose and concerns about hair loss.   Answers for HPI/ROS submitted by the patient on 10/16/2021  Please describe your symptoms.: N/A follow up appointment about adhd. I would like to discuss potentially raising my dose a little.  Have you had these symptoms before?: No  How long have you been having these symptoms?: 1-4 days  What is the primary reason for your visit?: Other        History per MA reviewed.    Adderall not working quite as well as it was originally  In [employer] school--one exam per course, essays, sometimes 7-8 hrs long based on texts 2000 pages         The following portions of the patient's history were reviewed and updated as appropriate: allergies, current medications, past family history, past medical history, past social history, past surgical history and problem list.    Review of Systems   Constitutional: Negative for fever.   Cardiovascular: Negative for chest pain and palpitations.   Psychiatric/Behavioral: Positive for decreased concentration. Negative for sleep disturbance.         Objective   Visit Vitals  /65 (BP Location: Left arm, Patient Position: Sitting, Cuff Size: Adult)   Pulse 76   Temp 97.3 °F (36.3 °C)   Resp 16   Ht 162.6 cm (64\")   Wt 78.5 kg (173 lb)   SpO2 98%   BMI 29.70 kg/m²       Physical Exam  Vitals and nursing note reviewed.   Constitutional:       General: She is not in acute distress.     Appearance: Normal appearance. She is well-developed and well-groomed. She is not ill-appearing, toxic-appearing or diaphoretic.      Interventions: Face mask in place.   HENT:      Head: Normocephalic and atraumatic.      Right Ear: Hearing normal.      Left Ear: Hearing normal.   Eyes:      General: Lids are normal. No scleral icterus.     Extraocular Movements: Extraocular movements intact.   Neck:      Trachea: Phonation normal. "   Cardiovascular:      Rate and Rhythm: Normal rate and regular rhythm.   Pulmonary:      Effort: Pulmonary effort is normal.   Musculoskeletal:      Cervical back: Neck supple.   Skin:     Coloration: Skin is not jaundiced or pale.   Neurological:      General: No focal deficit present.      Mental Status: She is alert and oriented to person, place, and time.      Motor: Motor function is intact.   Psychiatric:         Attention and Perception: Attention and perception normal.         Mood and Affect: Mood and affect normal.         Speech: Speech normal.         Behavior: Behavior normal. Behavior is cooperative.         Thought Content: Thought content normal.         Cognition and Memory: Cognition and memory normal.         Judgment: Judgment normal.         For medical decision making review of the following was required:  Compliance Drug Analysis, Ur - Urine, Clean Catch (03/15/2021 15:25)      Assessment/Plan     Problem List Items Addressed This Visit        Neuro    Attention deficit - Primary    Relevant Medications    amphetamine-dextroamphetamine (Adderall) 15 MG tablet          · KARLEE reviewed and appropriate.  Increased dose slightly. Can go up further if needed between visits.     Return in about 3 months (around 1/18/2022) for Annual physical.     Rosa M Cornelius MD

## 2021-11-16 ENCOUNTER — TELEPHONE (OUTPATIENT)
Dept: INTERNAL MEDICINE | Facility: CLINIC | Age: 31
End: 2021-11-16

## 2021-11-17 LAB
ALBUMIN SERPL-MCNC: 5 G/DL (ref 3.5–5.2)
ALBUMIN/GLOB SERPL: 2.2 G/DL
ALP SERPL-CCNC: 70 U/L (ref 39–117)
ALT SERPL-CCNC: 27 U/L (ref 1–33)
AST SERPL-CCNC: 25 U/L (ref 1–32)
BILIRUB SERPL-MCNC: 0.4 MG/DL (ref 0–1.2)
BUN SERPL-MCNC: 8 MG/DL (ref 6–20)
BUN/CREAT SERPL: 8.4 (ref 7–25)
CALCIUM SERPL-MCNC: 9.8 MG/DL (ref 8.6–10.5)
CHLORIDE SERPL-SCNC: 102 MMOL/L (ref 98–107)
CO2 SERPL-SCNC: 26.3 MMOL/L (ref 22–29)
CREAT SERPL-MCNC: 0.95 MG/DL (ref 0.57–1)
GLOBULIN SER CALC-MCNC: 2.3 GM/DL
GLUCOSE SERPL-MCNC: 87 MG/DL (ref 65–99)
HCV AB S/CO SERPL IA: <0.1 S/CO RATIO (ref 0–0.9)
POTASSIUM SERPL-SCNC: 4.2 MMOL/L (ref 3.5–5.2)
PROT SERPL-MCNC: 7.3 G/DL (ref 6–8.5)
SODIUM SERPL-SCNC: 138 MMOL/L (ref 136–145)
TSH SERPL DL<=0.005 MIU/L-ACNC: 1.23 UIU/ML (ref 0.27–4.2)

## 2021-11-30 ENCOUNTER — PATIENT MESSAGE (OUTPATIENT)
Dept: INTERNAL MEDICINE | Facility: CLINIC | Age: 31
End: 2021-11-30

## 2021-11-30 DIAGNOSIS — R41.840 ATTENTION DEFICIT: ICD-10-CM

## 2021-11-30 RX ORDER — DEXTROAMPHETAMINE SACCHARATE, AMPHETAMINE ASPARTATE, DEXTROAMPHETAMINE SULFATE AND AMPHETAMINE SULFATE 3.75; 3.75; 3.75; 3.75 MG/1; MG/1; MG/1; MG/1
15 TABLET ORAL 2 TIMES DAILY
Qty: 60 TABLET | Refills: 0 | Status: SHIPPED | OUTPATIENT
Start: 2021-11-30 | End: 2022-01-27 | Stop reason: SDUPTHER

## 2021-11-30 NOTE — TELEPHONE ENCOUNTER
..  Rx Refill Note  Requested Prescriptions     Pending Prescriptions Disp Refills   • amphetamine-dextroamphetamine (Adderall) 15 MG tablet 60 tablet 0     Sig: Take 1 tablet by mouth 2 (Two) Times a Day.      Last office visit with prescribing clinician: 10/18/2021      Next office visit with prescribing clinician: 1/11/2022            Aravind Bacon MA  11/30/21, 14:35 EST

## 2021-11-30 NOTE — TELEPHONE ENCOUNTER
From: Uyen Howell  To: Rosa M Cornelius MD  Sent: 11/30/2021 11:14 AM EST  Subject: Prescription refill    Hello Dr. Cornelius,     I am needing a refill sent in for the 15mg of adderall.     Thank you,  Uyen Howell

## 2022-01-25 ENCOUNTER — OFFICE VISIT (OUTPATIENT)
Dept: INTERNAL MEDICINE | Facility: CLINIC | Age: 32
End: 2022-01-25

## 2022-01-25 VITALS
DIASTOLIC BLOOD PRESSURE: 70 MMHG | BODY MASS INDEX: 28.85 KG/M2 | WEIGHT: 169 LBS | TEMPERATURE: 97.5 F | HEART RATE: 84 BPM | OXYGEN SATURATION: 97 % | SYSTOLIC BLOOD PRESSURE: 108 MMHG | HEIGHT: 64 IN

## 2022-01-25 DIAGNOSIS — R59.1 LYMPHADENOPATHY OF HEAD AND NECK: Primary | ICD-10-CM

## 2022-01-25 PROCEDURE — 99213 OFFICE O/P EST LOW 20 MIN: CPT | Performed by: FAMILY MEDICINE

## 2022-01-25 RX ORDER — CEFDINIR 300 MG/1
300 CAPSULE ORAL 2 TIMES DAILY
Qty: 20 CAPSULE | Refills: 0 | Status: SHIPPED | OUTPATIENT
Start: 2022-01-25 | End: 2022-05-23

## 2022-01-25 RX ORDER — FLUCONAZOLE 150 MG/1
150 TABLET ORAL ONCE
Qty: 1 TABLET | Refills: 1 | Status: SHIPPED | OUTPATIENT
Start: 2022-01-25 | End: 2022-01-25

## 2022-01-25 NOTE — PROGRESS NOTES
"Uyen Howell is a 31 y.o. female.    Chief Complaint   Patient presents with   • Fatigue       HPI   Patient reports left anterior lymphadenopathy for 1 day.  They report pain to lymph nodes.  Denies sore throat, ear pain, fever.  She reports nausea and dizziness yesterday.  Today she is mostly tired.  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  She is vaccinated against COVID as well.      The following portions of the patient's history were reviewed and updated as appropriate: allergies, current medications, past family history, past medical history, past social history, past surgical history and problem list.     Allergies   Allergen Reactions   • Biaxin [Clarithromycin] Swelling   • Amoxicillin Rash         Current Outpatient Medications:   •  amphetamine-dextroamphetamine (Adderall) 15 MG tablet, Take 1 tablet by mouth 2 (Two) Times a Day., Disp: 60 tablet, Rfl: 0  •  cefdinir (OMNICEF) 300 MG capsule, Take 1 capsule by mouth 2 (Two) Times a Day., Disp: 20 capsule, Rfl: 0    ROS    Review of Systems   Constitutional: Positive for fatigue. Negative for chills and fever.   HENT: Negative for congestion, ear pain and sore throat.    Respiratory: Negative for cough and shortness of breath.    Hematological: Positive for adenopathy.       Vitals:    01/25/22 1031   BP: 108/70   Pulse: 84   Temp: 97.5 °F (36.4 °C)   SpO2: 97%   Weight: 76.7 kg (169 lb)   Height: 162.6 cm (64.02\")     Body mass index is 28.99 kg/m².    Physical Exam     Physical Exam  Constitutional:       General: She is not in acute distress.     Appearance: She is well-developed.   HENT:      Head: Normocephalic and atraumatic.      Right Ear: External ear normal.      Left Ear: External ear normal.   Eyes:      Extraocular Movements: Extraocular movements intact.      Conjunctiva/sclera: Conjunctivae normal.   Cardiovascular:      Rate and Rhythm: Normal rate and regular rhythm.      Heart sounds: No murmur " heard.      Pulmonary:      Effort: Pulmonary effort is normal. No respiratory distress.      Breath sounds: Normal breath sounds. No wheezing.   Abdominal:      General: Bowel sounds are normal. There is no distension.      Palpations: Abdomen is soft.      Tenderness: There is no abdominal tenderness.   Musculoskeletal:         General: Normal range of motion.      Cervical back: Normal range of motion and neck supple.   Lymphadenopathy:      Cervical: Cervical adenopathy present.      Right cervical: Posterior cervical adenopathy present.      Left cervical: Superficial cervical adenopathy (2 large anterior cervical lymph nodes appeciated, tenderness to light palpitaion) present.   Skin:     General: Skin is warm and dry.   Neurological:      Mental Status: She is alert and oriented to person, place, and time.      Cranial Nerves: No cranial nerve deficit.         Assessment/Plan    Problems Addressed this Visit     None      Visit Diagnoses     Lymphadenopathy of head and neck    -  Primary    Relevant Orders    EBV Antibody Profile (Completed)        Concern for mono with fatigue and lymphadenopathy.  No other symptoms.  Will obtain labs to rule out EBV.  Will go ahead and treat for lymphadenitis with omnicef due to PCN allergy.  Provided with school excuse for the next couple of days.  If no improvement over the next 2-4 weeks, would recommend ultrasound of the neck.     New Medications Ordered This Visit   Medications   • cefdinir (OMNICEF) 300 MG capsule     Sig: Take 1 capsule by mouth 2 (Two) Times a Day.     Dispense:  20 capsule     Refill:  0   • fluconazole (Diflucan) 150 MG tablet     Sig: Take 1 tablet by mouth 1 (One) Time for 1 dose.     Dispense:  1 tablet     Refill:  1       No orders of the defined types were placed in this encounter.      Return if symptoms worsen or fail to improve.    Gabriela Coto DO

## 2022-01-26 LAB
EBV NA IGG SER IA-ACNC: 192 U/ML (ref 0–17.9)
EBV VCA IGG SER IA-ACNC: 33.1 U/ML (ref 0–17.9)
EBV VCA IGM SER IA-ACNC: <36 U/ML (ref 0–35.9)
SERVICE CMNT-IMP: ABNORMAL

## 2022-01-27 ENCOUNTER — OFFICE VISIT (OUTPATIENT)
Dept: INTERNAL MEDICINE | Facility: CLINIC | Age: 32
End: 2022-01-27

## 2022-01-27 VITALS
HEART RATE: 66 BPM | OXYGEN SATURATION: 97 % | RESPIRATION RATE: 16 BRPM | SYSTOLIC BLOOD PRESSURE: 106 MMHG | HEIGHT: 64 IN | TEMPERATURE: 97 F | BODY MASS INDEX: 29.88 KG/M2 | WEIGHT: 175 LBS | DIASTOLIC BLOOD PRESSURE: 72 MMHG

## 2022-01-27 DIAGNOSIS — R41.840 ATTENTION DEFICIT: ICD-10-CM

## 2022-01-27 DIAGNOSIS — Z00.00 ANNUAL PHYSICAL EXAM: Primary | ICD-10-CM

## 2022-01-27 DIAGNOSIS — R53.83 FATIGUE, UNSPECIFIED TYPE: ICD-10-CM

## 2022-01-27 DIAGNOSIS — Z13.220 SCREENING, LIPID: ICD-10-CM

## 2022-01-27 DIAGNOSIS — Z28.21 INFLUENZA VACCINATION DECLINED: ICD-10-CM

## 2022-01-27 DIAGNOSIS — Z72.0 TOBACCO USE: ICD-10-CM

## 2022-01-27 DIAGNOSIS — R59.0 CERVICAL LYMPHADENOPATHY: ICD-10-CM

## 2022-01-27 PROCEDURE — 99395 PREV VISIT EST AGE 18-39: CPT | Performed by: FAMILY MEDICINE

## 2022-01-27 PROCEDURE — 99214 OFFICE O/P EST MOD 30 MIN: CPT | Performed by: FAMILY MEDICINE

## 2022-01-27 PROCEDURE — 3008F BODY MASS INDEX DOCD: CPT | Performed by: FAMILY MEDICINE

## 2022-01-27 PROCEDURE — 2014F MENTAL STATUS ASSESS: CPT | Performed by: FAMILY MEDICINE

## 2022-01-27 PROCEDURE — U0004 COV-19 TEST NON-CDC HGH THRU: HCPCS | Performed by: FAMILY MEDICINE

## 2022-01-27 RX ORDER — FLUCONAZOLE 150 MG/1
150 TABLET ORAL
COMMUNITY
Start: 2022-01-25

## 2022-01-27 RX ORDER — DEXTROAMPHETAMINE SACCHARATE, AMPHETAMINE ASPARTATE, DEXTROAMPHETAMINE SULFATE AND AMPHETAMINE SULFATE 3.75; 3.75; 3.75; 3.75 MG/1; MG/1; MG/1; MG/1
15 TABLET ORAL 2 TIMES DAILY
Qty: 60 TABLET | Refills: 0 | Status: SHIPPED | OUTPATIENT
Start: 2022-01-27 | End: 2022-03-07 | Stop reason: SDUPTHER

## 2022-01-27 NOTE — PROGRESS NOTES
01/27/2022  Chief Complaint   Patient presents with   • Annual Exam     annual physical   • Fatigue     with very swollen lymph nodes since monday, was seen in office on tuesday       Uyen Howell is here for her annual preventive exam. History per MA reviewed.     Very tired the last week. Always somewhat tired (stress/school?) extra bad this week. No cats, no known cat scratches. Labs earlier this week show past EBV infection, normal TSH, normal CMP. Was put on cefdinir and has some diarrhea, no noticeable decrease in lymph node size, more painful. No known mouth lesions, dental issues.    Eye exam up to date  Dental exam not up to date due to insurance issues    Adderall continues to be helpful w/o adverse effects.    Uyen Howell has the following medical issues:  Patient Active Problem List    Diagnosis    • Attention deficit [R41.840]        Health Maintenance   Topic Date Due   • ANNUAL PHYSICAL  Never done   • Pneumococcal Vaccine 0-64 (1 of 2 - PPSV23) Never done   • COVID-19 Vaccine (3 - Booster for Pfizer series) 09/22/2021   • INFLUENZA VACCINE  03/31/2022 (Originally 8/1/2021)   • TDAP/TD VACCINES (1 - Tdap) 01/01/2023 (Originally 10/13/2011)   • PAP SMEAR  06/18/2022   • HEPATITIS C SCREENING  Completed       Immunization History   Administered Date(s) Administered   • COVID-19 (PFIZER) PURPLE CAP 04/01/2021, 04/22/2021   • Flu Vaccine Quad PF >36MO 10/28/2020       Review of Systems   Constitutional: Positive for fatigue.   Gastrointestinal: Positive for diarrhea. Negative for abdominal pain.   Skin: Negative for color change.   Neurological: Positive for headache.   Hematological: Positive for adenopathy.   Psychiatric/Behavioral: Positive for stress.   All other systems reviewed and are negative.      The following portions of the patient's history were reviewed and updated as appropriate: allergies, current medications, past family history, past medical history, past social history, past surgical  "history and problem list.    Objective   Visit Vitals  /72 (BP Location: Left arm, Patient Position: Sitting, Cuff Size: Adult)   Pulse 66   Temp 97 °F (36.1 °C) (Temporal)   Resp 16   Ht 162.6 cm (64\")   Wt 79.4 kg (175 lb)   SpO2 97%   BMI 30.04 kg/m²        Physical Exam  Vitals and nursing note reviewed.   Constitutional:       General: She is not in acute distress.     Appearance: Normal appearance. She is well-developed and well-groomed. She is not ill-appearing, toxic-appearing or diaphoretic.      Interventions: Face mask in place.   HENT:      Head: Normocephalic and atraumatic. Hair is normal.      Right Ear: Hearing, tympanic membrane, ear canal and external ear normal.      Left Ear: Hearing, tympanic membrane, ear canal and external ear normal.   Eyes:      General: Lids are normal. Gaze aligned appropriately. No scleral icterus.        Right eye: No discharge.         Left eye: No discharge.      Extraocular Movements: Extraocular movements intact.      Conjunctiva/sclera: Conjunctivae normal.      Pupils: Pupils are equal, round, and reactive to light.   Neck:      Thyroid: No thyromegaly.      Trachea: Trachea and phonation normal. No tracheal deviation.   Cardiovascular:      Rate and Rhythm: Normal rate and regular rhythm.      Heart sounds: Normal heart sounds. No murmur heard.  No friction rub. No gallop.    Pulmonary:      Effort: Pulmonary effort is normal.      Breath sounds: Normal breath sounds and air entry.   Chest:   Breasts:      Right: No supraclavicular adenopathy.      Left: No supraclavicular adenopathy.       Abdominal:      General: Bowel sounds are normal. There is no distension.      Palpations: Abdomen is soft. Abdomen is not rigid. There is no hepatomegaly, splenomegaly or mass.      Tenderness: There is abdominal tenderness in the left upper quadrant. There is no guarding or rebound.   Musculoskeletal:         General: No tenderness or deformity.      Cervical back: Neck " supple.      Right lower leg: No edema.      Left lower leg: No edema.   Lymphadenopathy:      Cervical: Cervical adenopathy present.      Left cervical: Superficial cervical adenopathy and posterior cervical adenopathy present.      Upper Body:      Right upper body: No supraclavicular adenopathy.      Left upper body: No supraclavicular adenopathy.   Skin:     General: Skin is warm.      Capillary Refill: Capillary refill takes less than 2 seconds.      Coloration: Skin is not cyanotic, jaundiced or pale.      Findings: No erythema or rash.      Nails: There is no clubbing.   Neurological:      General: No focal deficit present.      Mental Status: She is alert and oriented to person, place, and time.      Cranial Nerves: No cranial nerve deficit.      Motor: No tremor, atrophy, abnormal muscle tone or seizure activity.      Gait: Gait is intact. Gait normal.   Psychiatric:         Attention and Perception: Attention and perception normal.         Mood and Affect: Mood and affect normal.         Speech: Speech normal.         Behavior: Behavior normal. Behavior is cooperative.         Thought Content: Thought content normal.         Cognition and Memory: Cognition and memory normal.         Judgment: Judgment normal.         No results found for: CHOL, CHLPL, TRIG, HDL, LDL, LDLDIRECT  Lab Results   Component Value Date    TSH 1.230 11/16/2021     No results found for: FREET4  No results found for: HGBA1C    Assessment     Problem List Items Addressed This Visit        Neuro    Attention deficit    Relevant Medications    amphetamine-dextroamphetamine (Adderall) 15 MG tablet      Other Visit Diagnoses     Annual physical exam    -  Primary    Influenza vaccination declined        Tobacco use        Cervical lymphadenopathy        Relevant Orders    CBC & Differential    CMV IgG IgM    COVID-19 PCR, [facility], NP SWAB IN [facility] VIRAL TRANSPORT MEDIA/ORAL SWISH 24-30 HR TAT - Swab, Nasopharynx    Toxoplasma  Antibodies IgG / IgM    QuantiFERON TB Gold    RPR    Antistreptolysin O (ASO) Titer    Fatigue, unspecified type        Relevant Medications    amphetamine-dextroamphetamine (Adderall) 15 MG tablet    Other Relevant Orders    CBC & Differential    CMV IgG IgM    Vitamin B12    Folate    Vitamin D 25 Hydroxy    COVID-19 PCR, [facility], NP SWAB IN [place] VIRAL TRANSPORT MEDIA/ORAL SWISH 24-30 HR TAT - Swab, Nasopharynx    Toxoplasma Antibodies IgG / IgM    QuantiFERON TB Gold    RPR    Antistreptolysin O (ASO) Titer    Screening, lipid        Relevant Orders    Lipid Panel          · Health maintenance information provided with patient plan.   · Counseled on age appropriate health screenings.  · Immunizations for age discussed, encouraged.   · Encourage healthy habits such as exercise, healthy diet.  Patient's Body mass index is 30.04 kg/m². indicating that she is obese (BMI >30). Obesity-related health conditions include the following: none. Obesity is unchanged. BMI is is above average; BMI management plan is completed. We discussed portion control and increasing exercise..  · KARLEE reviewed and appropriate.  UDS next visit.  · Return in about 3 months (around 4/27/2022) for Controlled Rx Follow Up.     I spent 15 minutes on the separately reported service of [id number]. This time is not included in the time used to support the E/M service also reported today.    Rosa M Cornelius MD

## 2022-01-28 ENCOUNTER — TELEPHONE (OUTPATIENT)
Dept: INTERNAL MEDICINE | Facility: CLINIC | Age: 32
End: 2022-01-28

## 2022-01-28 LAB — SARS-COV-2 RNA NOSE QL NAA+PROBE: NOT DETECTED

## 2022-01-28 RX ORDER — CHOLECALCIFEROL (VITAMIN D3) 1250 MCG
50000 CAPSULE ORAL
Qty: 12 CAPSULE | Refills: 1 | Status: SHIPPED | OUTPATIENT
Start: 2022-01-28 | End: 2022-09-09

## 2022-01-28 NOTE — TELEPHONE ENCOUNTER
Caller: Uyen Howell    Relationship: Self    Best call back number: [phone]        What test was performed: COVID-19 TEST RESULTS     When was the test performed: 01/27/2022    Where was the test performed: IN OFFICE     Additional notes: THE PATIENT WOULD LIKE TO KNOW IF THE RESULTS ARE IN YET SHE STATES THAT SHE NEEDS TO KNOW IN ORDER TO DETERMINE IF SHE CAN GO TO CLASS ON Monday

## 2022-01-31 LAB
25(OH)D3+25(OH)D2 SERPL-MCNC: 20.3 NG/ML (ref 30–100)
ASO AB SERPL-ACNC: 26.7 IU/ML (ref 0–200)
BASOPHILS # BLD AUTO: 0.1 X10E3/UL (ref 0–0.2)
BASOPHILS NFR BLD AUTO: 1 %
CHOLEST SERPL-MCNC: 127 MG/DL (ref 100–199)
CMV IGG SERPL IA-ACNC: <0.6 U/ML (ref 0–0.59)
CMV IGM SERPL IA-ACNC: <30 AU/ML (ref 0–29.9)
EOSINOPHIL # BLD AUTO: 0.1 X10E3/UL (ref 0–0.4)
EOSINOPHIL NFR BLD AUTO: 2 %
ERYTHROCYTE [DISTWIDTH] IN BLOOD BY AUTOMATED COUNT: 12.2 % (ref 11.7–15.4)
FOLATE SERPL-MCNC: 7.6 NG/ML
GAMMA INTERFERON BACKGROUND BLD IA-ACNC: 0.03 IU/ML
HCT VFR BLD AUTO: 39.3 % (ref 34–46.6)
HDLC SERPL-MCNC: 44 MG/DL
HGB BLD-MCNC: 12.8 G/DL (ref 11.1–15.9)
IMM GRANULOCYTES # BLD AUTO: 0 X10E3/UL (ref 0–0.1)
IMM GRANULOCYTES NFR BLD AUTO: 0 %
LABORATORY COMMENT REPORT: NORMAL
LDLC SERPL CALC-MCNC: 60 MG/DL (ref 0–99)
LYMPHOCYTES # BLD AUTO: 2.4 X10E3/UL (ref 0.7–3.1)
LYMPHOCYTES NFR BLD AUTO: 36 %
M TB IFN-G BLD-IMP: NEGATIVE
M TB IFN-G CD4+ BCKGRND COR BLD-ACNC: 0.02 IU/ML
M TB IFN-G CD4+CD8+ BCKGRND COR BLD-ACNC: 0.02 IU/ML
MCH RBC QN AUTO: 29.2 PG (ref 26.6–33)
MCHC RBC AUTO-ENTMCNC: 32.6 G/DL (ref 31.5–35.7)
MCV RBC AUTO: 90 FL (ref 79–97)
MITOGEN IGNF BLD-ACNC: >10 IU/ML
MONOCYTES # BLD AUTO: 0.3 X10E3/UL (ref 0.1–0.9)
MONOCYTES NFR BLD AUTO: 5 %
NEUTROPHILS # BLD AUTO: 3.7 X10E3/UL (ref 1.4–7)
NEUTROPHILS NFR BLD AUTO: 56 %
PLATELET # BLD AUTO: 247 X10E3/UL (ref 150–450)
QUANTIFERON INCUBATION: NORMAL
RBC # BLD AUTO: 4.39 X10E6/UL (ref 3.77–5.28)
RPR SER QL: NON REACTIVE
SERVICE CMNT-IMP: NORMAL
T GONDII IGG SERPL IA-ACNC: <3 IU/ML (ref 0–7.1)
T GONDII IGM SER IA-ACNC: <3 AU/ML (ref 0–7.9)
TRIGL SERPL-MCNC: 129 MG/DL (ref 0–149)
VIT B12 SERPL-MCNC: 258 PG/ML (ref 232–1245)
VLDLC SERPL CALC-MCNC: 23 MG/DL (ref 5–40)
WBC # BLD AUTO: 6.6 X10E3/UL (ref 3.4–10.8)

## 2022-03-07 DIAGNOSIS — R53.83 FATIGUE, UNSPECIFIED TYPE: ICD-10-CM

## 2022-03-07 DIAGNOSIS — R41.840 ATTENTION DEFICIT: ICD-10-CM

## 2022-03-07 RX ORDER — DEXTROAMPHETAMINE SACCHARATE, AMPHETAMINE ASPARTATE, DEXTROAMPHETAMINE SULFATE AND AMPHETAMINE SULFATE 3.75; 3.75; 3.75; 3.75 MG/1; MG/1; MG/1; MG/1
15 TABLET ORAL 2 TIMES DAILY
Qty: 60 TABLET | Refills: 0 | Status: SHIPPED | OUTPATIENT
Start: 2022-03-07 | End: 2022-05-23 | Stop reason: SDUPTHER

## 2022-05-16 DIAGNOSIS — R53.83 FATIGUE, UNSPECIFIED TYPE: ICD-10-CM

## 2022-05-16 DIAGNOSIS — R41.840 ATTENTION DEFICIT: ICD-10-CM

## 2022-05-16 RX ORDER — DEXTROAMPHETAMINE SACCHARATE, AMPHETAMINE ASPARTATE, DEXTROAMPHETAMINE SULFATE AND AMPHETAMINE SULFATE 3.75; 3.75; 3.75; 3.75 MG/1; MG/1; MG/1; MG/1
15 TABLET ORAL 2 TIMES DAILY
Qty: 60 TABLET | Refills: 0 | OUTPATIENT
Start: 2022-05-16

## 2022-05-16 NOTE — TELEPHONE ENCOUNTER
Rx Refill Note  Requested Prescriptions     Pending Prescriptions Disp Refills   • amphetamine-dextroamphetamine (Adderall) 15 MG tablet 60 tablet 0     Sig: Take 1 tablet by mouth 2 (Two) Times a Day.      Last office visit with prescribing clinician: 1/27/2022      Next office visit with prescribing clinician: Visit date not found            Erika Ray LPN  05/16/22, 08:08 EDT     OV: [id number]  UDS: [id number]  CSA: [id number]

## 2022-05-23 ENCOUNTER — OFFICE VISIT (OUTPATIENT)
Dept: INTERNAL MEDICINE | Facility: CLINIC | Age: 32
End: 2022-05-23

## 2022-05-23 VITALS
BODY MASS INDEX: 28.24 KG/M2 | DIASTOLIC BLOOD PRESSURE: 70 MMHG | HEIGHT: 64 IN | WEIGHT: 165.4 LBS | SYSTOLIC BLOOD PRESSURE: 106 MMHG | OXYGEN SATURATION: 97 % | RESPIRATION RATE: 18 BRPM | TEMPERATURE: 98 F | HEART RATE: 84 BPM

## 2022-05-23 DIAGNOSIS — R41.840 ATTENTION DEFICIT: Primary | ICD-10-CM

## 2022-05-23 DIAGNOSIS — Z51.81 MEDICATION MONITORING ENCOUNTER: ICD-10-CM

## 2022-05-23 PROCEDURE — 99213 OFFICE O/P EST LOW 20 MIN: CPT | Performed by: FAMILY MEDICINE

## 2022-05-23 RX ORDER — DEXTROAMPHETAMINE SACCHARATE, AMPHETAMINE ASPARTATE, DEXTROAMPHETAMINE SULFATE AND AMPHETAMINE SULFATE 3.75; 3.75; 3.75; 3.75 MG/1; MG/1; MG/1; MG/1
15 TABLET ORAL 2 TIMES DAILY
Qty: 60 TABLET | Refills: 0 | Status: SHIPPED | OUTPATIENT
Start: 2022-05-23 | End: 2022-08-19 | Stop reason: SDUPTHER

## 2022-05-23 NOTE — PATIENT INSTRUCTIONS
Living With Attention Deficit Hyperactivity Disorder  If you have been diagnosed with attention deficit hyperactivity disorder (ADHD), you may be relieved that you now know why you have felt or behaved a certain way. Still, you may feel overwhelmed about the treatment ahead. You may also wonder how to get the support you need and how to deal with the condition day-to-day. With treatment and support, you can live with ADHD and manage your symptoms.  How to manage lifestyle changes  Managing stress  Stress is your body's reaction to life changes and events, both good and bad. To cope with the stress of an ADHD diagnosis, it may help to:  Learn more about ADHD.  Exercise regularly. Even a short daily walk can lower stress levels.  Participate in training or education programs (including social skills training classes) that teach you to deal with symptoms.    Medicines  Your health care provider may suggest certain medicines if he or she feels that they will help to improve your condition. Stimulant medicines are usually prescribed to treat ADHD, and therapy may also be prescribed. It is important to:  Avoid using alcohol and other substances that may prevent your medicines from working properly (may interact).  Talk with your pharmacist or health care provider about all the medicines that you take, their possible side effects, and what medicines are safe to take together.  Make it your goal to take part in all treatment decisions (shared decision-making). Ask about possible side effects of medicines that your health care provider recommends, and tell him or her how you feel about having those side effects. It is best if shared decision-making with your health care provider is part of your total treatment plan.  Relationships  To strengthen your relationships with family members while treating your condition, consider taking part in family therapy. You might also attend self-help groups alone or with a loved one.  Be  honest about how your symptoms affect your relationships. Make an effort to communicate respectfully instead of fighting, and find ways to show others that you care. Psychotherapy may be useful in helping you cope with how ADHD affects your relationships.  How to recognize changes in your condition  The following signs may mean that your treatment is working well and your condition is improving:  Consistently being on time for appointments.  Being more organized at home and work.  Other people noticing improvements in your behavior.  Achieving goals that you set for yourself.  Thinking more clearly.  The following signs may mean that your treatment is not working very well:  Feeling impatience or more confusion.  Missing, forgetting, or being late for appointments.  An increasing sense of disorganization and messiness.  More difficulty in reaching goals that you set for yourself.  Loved ones becoming angry or frustrated with you.  Follow these instructions at home:  Take over-the-counter and prescription medicines only as told by your health care provider. Check with your health care provider before taking any new medicines.  Create structure and an organized atmosphere at home. For example:  Make a list of tasks, then rank them from most important to least important. Work on one task at a time until your listed tasks are done.  Make a daily schedule and follow it consistently every day.  Use an appointment calendar, and check it 2 or 3 times a day to keep on track. Keep it with you when you leave the house.  Create spaces where you keep certain things, and always put things back in their places after you use them.  Keep all follow-up visits as told by your health care provider. This is important.  Where to find support  Talking to others    Keep emotion out of important discussions and speak in a calm, logical way.  Listen closely and patiently to your loved ones. Try to understand their point of view, and try to  avoid getting defensive.  Take responsibility for the consequences of your actions.  Ask that others do not take your behaviors personally.  Aim to solve problems as they come up, and express your feelings instead of bottling them up.  Talk openly about what you need from your loved ones and how they can support you.  Consider going to family therapy sessions or having your family meet with a specialist who deals with ADHD-related behavior problems.    Finances  Not all insurance plans cover mental health care, so it is important to check with your insurance carrier. If paying for co-pays or counseling services is a problem, search for a [facility] or [facility] mental health care center. Public mental health care services may be offered there at a low cost or no cost when you are not able to see a private health care provider.  If you are taking medicine for ADHD, you may be able to get the generic form, which may be less expensive than brand-name medicine. Some makers of prescription medicines also offer help to patients who cannot afford the medicines that they need.  Questions to ask your health care provider:  What are the risks and benefits of taking medicines?  Would I benefit from therapy?  How often should I follow up with a health care provider?  Contact a health care provider if:  You have side effects from your medicines, such as:  Repeated muscle twitches, coughing, or speech outbursts.  Sleep problems.  Loss of appetite.  Depression.  New or worsening behavior problems.  Dizziness.  Unusually fast heartbeat.  Stomach pains.  Headaches.  Get help right away if:  You have a severe reaction to a medicine.  Your behavior suddenly gets worse.  Summary  With treatment and support, you can live with ADHD and manage your symptoms.  The medicines that are most often prescribed for ADHD are stimulants.  Consider taking part in family therapy or self-help groups with family members or friends.  When you talk with friends  and family about your ADHD, be patient and communicate openly.  Take over-the-counter and prescription medicines only as told by your health care provider. Check with your health care provider before taking any new medicines.  This information is not intended to replace advice given to you by your health care provider. Make sure you discuss any questions you have with your health care provider.  Document Revised: 06/02/2021 Document Reviewed: 06/02/2021  Elsevier Patient Education © 2021 [employer]

## 2022-05-23 NOTE — PROGRESS NOTES
"05/23/2022      Assessment & Plan    Problem List Items Addressed This Visit        Neuro    Attention deficit - Primary    Relevant Medications    amphetamine-dextroamphetamine (Adderall) 15 MG tablet    Other Relevant Orders    Urine Drug Screen - Urine, Clean Catch      Other Visit Diagnoses     Medication monitoring encounter        Relevant Orders    Urine Drug Screen - Urine, Clean Catch        KARLEE reviewed and appropriate.    UDS update today  Doing well on med no changes needed.      Return in 3 months (on 8/23/2022) for Controlled Rx Follow Up, telehealth okay.      Subjective      Uyen Howell is a 31 y.o. female here for:  Chief Complaint   Patient presents with   • ADHD     Medication refills            History per MA reviewed.    Overall feeling okay. No chest pain, palpitations. Sleeping okay. Med still helping w/o side effects. Dose is good. 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          The following portions of the patient's history were reviewed and updated as appropriate: allergies, current medications, past family history, past medical history, past social history, past surgical history and problem list.    Review of Systems   Constitutional: Negative for fever.   Cardiovascular: Negative for chest pain and palpitations.   Psychiatric/Behavioral: Positive for stress.         Objective   Visit Vitals  /70 (BP Location: Right arm, Patient Position: Sitting, Cuff Size: Adult)   Pulse 84   Temp 98 °F (36.7 °C) (Temporal)   Resp 18   Ht 162.6 cm (64\")   Wt 75 kg (165 lb 6.4 oz)   SpO2 97%   BMI 28.39 kg/m²       Physical Exam  Vitals and nursing note reviewed.   Constitutional:       General: She is not in acute distress.     Appearance: Normal appearance. She is well-developed and well-groomed. She is not ill-appearing, toxic-appearing or diaphoretic.      Interventions: Face mask in place.   HENT:      Head: Normocephalic and atraumatic. "      Right Ear: Hearing normal.      Left Ear: Hearing normal.   Eyes:      General: Lids are normal. No scleral icterus.     Extraocular Movements: Extraocular movements intact.   Neck:      Trachea: Phonation normal.   Cardiovascular:      Rate and Rhythm: Normal rate and regular rhythm.      Heart sounds: No murmur heard.  Pulmonary:      Effort: Pulmonary effort is normal.   Musculoskeletal:      Cervical back: Neck supple.   Lymphadenopathy:      Cervical: No cervical adenopathy.   Skin:     Coloration: Skin is not jaundiced or pale.   Neurological:      General: No focal deficit present.      Mental Status: She is alert and oriented to person, place, and time.      Motor: Motor function is intact. No tremor.   Psychiatric:         Attention and Perception: Attention and perception normal.         Mood and Affect: Mood and affect normal.         Speech: Speech normal.         Behavior: Behavior normal. Behavior is cooperative.         Thought Content: Thought content normal.         Cognition and Memory: Cognition and memory normal.         Judgment: Judgment normal.           For medical decision making review of the following was required:  Compliance Drug Analysis, Ur - Urine, Clean Catch (03/15/2021 15:25)          Rosa M Cornelius MD

## 2022-05-24 LAB
AMPHETAMINES UR QL SCN: POSITIVE NG/ML
BARBITURATES UR QL SCN: NEGATIVE NG/ML
BENZODIAZ UR QL SCN: NEGATIVE NG/ML
BZE UR QL SCN: NEGATIVE NG/ML
CANNABINOIDS UR QL SCN: NEGATIVE NG/ML
CREAT UR-MCNC: 162.8 MG/DL (ref 20–300)
LABORATORY COMMENT REPORT: ABNORMAL
METHADONE UR QL SCN: NEGATIVE NG/ML
OPIATES UR QL SCN: NEGATIVE NG/ML
OXYCODONE+OXYMORPHONE UR QL SCN: NEGATIVE NG/ML
PCP UR QL: NEGATIVE NG/ML
PH UR: 6.1 [PH] (ref 4.5–8.9)
PROPOXYPH UR QL SCN: NEGATIVE NG/ML

## 2022-05-25 NOTE — PROGRESS NOTES
Urine drug screen result is appropriate. Due to controlled substance use this test must be completed minimal once a year.

## 2022-07-13 RX ORDER — METHOCARBAMOL 750 MG/1
TABLET ORAL
Qty: 12 CAPSULE | Refills: 1 | OUTPATIENT
Start: 2022-07-13

## 2022-07-13 NOTE — TELEPHONE ENCOUNTER
Rx Refill Note  Requested Prescriptions     Pending Prescriptions Disp Refills   • D3-50 1.25 MG (16505 UT) capsule [Pharmacy Med Name: VITAMIN D3-50 50,000 UNIT CAP] 12 capsule 1     Sig: TAKE 1 CAPSULE BY MOUTH EVERY 7 DAYS.      Last office visit with prescribing clinician: 5/23/2022      Next office visit with prescribing clinician: 8/26/2022            Sanjuanita Hinojosa LPN  07/13/22, 10:43 EDT     Is the dosage still appropriate?

## 2022-07-13 NOTE — TELEPHONE ENCOUNTER
Unable to reach patient by phone, left detailed VM, per verbal release, regarding recommendations.

## 2022-08-19 DIAGNOSIS — R41.840 ATTENTION DEFICIT: ICD-10-CM

## 2022-08-19 RX ORDER — DEXTROAMPHETAMINE SACCHARATE, AMPHETAMINE ASPARTATE, DEXTROAMPHETAMINE SULFATE AND AMPHETAMINE SULFATE 3.75; 3.75; 3.75; 3.75 MG/1; MG/1; MG/1; MG/1
15 TABLET ORAL 2 TIMES DAILY
Qty: 60 TABLET | Refills: 0 | Status: SHIPPED | OUTPATIENT
Start: 2022-08-19 | End: 2022-10-16 | Stop reason: SDUPTHER

## 2022-08-19 NOTE — TELEPHONE ENCOUNTER
Rx Refill Note  Requested Prescriptions     Pending Prescriptions Disp Refills   • amphetamine-dextroamphetamine (Adderall) 15 MG tablet 60 tablet 0     Sig: Take 1 tablet by mouth 2 (Two) Times a Day.      Last office visit with prescribing clinician: 5/23/2022      Next office visit with prescribing clinician: 9/9/2022 05/23/2022  {TIP  Is Refill Pharmacy correct?:23}  Miguelina Crowell MA  08/19/22, 13:54 EDT

## 2022-09-09 ENCOUNTER — TELEMEDICINE (OUTPATIENT)
Dept: INTERNAL MEDICINE | Facility: CLINIC | Age: 32
End: 2022-09-09

## 2022-09-09 DIAGNOSIS — R41.840 ATTENTION DEFICIT: Primary | ICD-10-CM

## 2022-09-09 DIAGNOSIS — E55.9 VITAMIN D DEFICIENCY: ICD-10-CM

## 2022-09-09 PROCEDURE — 99213 OFFICE O/P EST LOW 20 MIN: CPT | Performed by: FAMILY MEDICINE

## 2022-09-09 NOTE — PROGRESS NOTES
Chief Complaint  ADHD (Medication follow up)    Subjective         Uyen Howell presents to [facility]  Med working well. Sometimes doesn't take bid on weekends. In school. Med helps. No side effects.     Vitamin D--should take daily dose? Completed weekly high dose.    Objective   Vital Signs:   There were no vitals taken for this visit.    Physical Exam   Constitutional: She appears well-developed and well-nourished.   Pulmonary/Chest: Effort normal.   Neurological: She is alert.   Psychiatric: She has a normal mood and affect.     Result Review :   Urine Drug Screen - Urine, Clean Catch (05/23/2022 16:50)                Assessment and Plan    Diagnoses and all orders for this visit:    1. Attention deficit (Primary)  Comments:  too soon to refill med, contact provider few days before out and will send refill. no red flags. continues to help. next appt scheduled after finals done.    2. Vitamin D deficiency  -     Cholecalciferol 25 MCG (1000 UT) capsule; Take 1 capsule by mouth Daily.  Dispense: 90 capsule; Refill: 3    KARLEE reviewed and appropriate.      Follow Up   Return for 12/16 at 12:45, check vitd.  Patient was given instructions and counseling regarding her condition or for health maintenance advice. Please see specific information pulled into the AVS if appropriate.     Mode of Visit: Video  Location of patient: home  [place] [facility]  You have chosen to receive care through a telehealth visit.  The patient has signed the video visit consent form.  The visit included audio and video interaction. No technical issues occurred during this visit.

## 2022-10-16 DIAGNOSIS — R41.840 ATTENTION DEFICIT: ICD-10-CM

## 2022-10-18 RX ORDER — DEXTROAMPHETAMINE SACCHARATE, AMPHETAMINE ASPARTATE, DEXTROAMPHETAMINE SULFATE AND AMPHETAMINE SULFATE 3.75; 3.75; 3.75; 3.75 MG/1; MG/1; MG/1; MG/1
15 TABLET ORAL 2 TIMES DAILY
Qty: 60 TABLET | Refills: 0 | Status: SHIPPED | OUTPATIENT
Start: 2022-10-18 | End: 2022-12-15 | Stop reason: SDUPTHER

## 2022-10-18 NOTE — TELEPHONE ENCOUNTER
Rx Refill Note  Requested Prescriptions     Pending Prescriptions Disp Refills   • amphetamine-dextroamphetamine (Adderall) 15 MG tablet 60 tablet 0     Sig: Take 1 tablet by mouth 2 (Two) Times a Day.      Last office visit with prescribing clinician: 5/23/2022      Next office visit with prescribing clinician: 12/16/2022 05/23/2022  {TIP  Is Refill Pharmacy correct?:23}  Miguelina Crowell MA  10/18/22, 16:22 EDT

## 2022-11-22 ENCOUNTER — TELEPHONE (OUTPATIENT)
Dept: INTERNAL MEDICINE | Facility: CLINIC | Age: 32
End: 2022-11-22

## 2022-11-22 NOTE — TELEPHONE ENCOUNTER
Due to my kids' school calendar I'll be out of office on 12/16. I've opened up 12/2 and put the patient on that Friday morning at 8 am, same time as her appointment was on 12/16. Please see if that works for her, and if it does not, work to get her rescheduled. Thanks.

## 2022-12-15 DIAGNOSIS — R41.840 ATTENTION DEFICIT: ICD-10-CM

## 2022-12-15 RX ORDER — DEXTROAMPHETAMINE SACCHARATE, AMPHETAMINE ASPARTATE, DEXTROAMPHETAMINE SULFATE AND AMPHETAMINE SULFATE 3.75; 3.75; 3.75; 3.75 MG/1; MG/1; MG/1; MG/1
15 TABLET ORAL 2 TIMES DAILY
Qty: 60 TABLET | Refills: 0 | Status: SHIPPED | OUTPATIENT
Start: 2022-12-15 | End: 2023-01-17 | Stop reason: SDUPTHER

## 2023-01-17 ENCOUNTER — OFFICE VISIT (OUTPATIENT)
Dept: INTERNAL MEDICINE | Facility: CLINIC | Age: 33
End: 2023-01-17
Payer: COMMERCIAL

## 2023-01-17 VITALS
HEIGHT: 64 IN | HEART RATE: 96 BPM | DIASTOLIC BLOOD PRESSURE: 74 MMHG | WEIGHT: 176.4 LBS | SYSTOLIC BLOOD PRESSURE: 106 MMHG | BODY MASS INDEX: 30.11 KG/M2 | RESPIRATION RATE: 16 BRPM | OXYGEN SATURATION: 96 % | TEMPERATURE: 99 F

## 2023-01-17 DIAGNOSIS — Z13.220 SCREENING, LIPID: ICD-10-CM

## 2023-01-17 DIAGNOSIS — E53.8 LOW SERUM VITAMIN B12: ICD-10-CM

## 2023-01-17 DIAGNOSIS — E55.9 VITAMIN D DEFICIENCY: ICD-10-CM

## 2023-01-17 DIAGNOSIS — R41.840 ATTENTION DEFICIT: Primary | ICD-10-CM

## 2023-01-17 DIAGNOSIS — R79.9 ABNORMAL BLOOD CHEMISTRY: ICD-10-CM

## 2023-01-17 DIAGNOSIS — Z51.81 MEDICATION MONITORING ENCOUNTER: ICD-10-CM

## 2023-01-17 PROCEDURE — 99214 OFFICE O/P EST MOD 30 MIN: CPT | Performed by: FAMILY MEDICINE

## 2023-01-17 RX ORDER — DEXTROAMPHETAMINE SACCHARATE, AMPHETAMINE ASPARTATE, DEXTROAMPHETAMINE SULFATE AND AMPHETAMINE SULFATE 3.75; 3.75; 3.75; 3.75 MG/1; MG/1; MG/1; MG/1
15 TABLET ORAL 2 TIMES DAILY
Qty: 60 TABLET | Refills: 0 | Status: SHIPPED | OUTPATIENT
Start: 2023-01-17

## 2023-01-17 NOTE — PROGRESS NOTES
"Chief Complaint  ADHD (Medication follow up)  Answers for HPI/ROS submitted by the patient on 1/15/2023  Please describe your symptoms.: This is a follow-up for medication  Have you had these symptoms before?: Yes  How long have you been having these symptoms?: Greater than 2 weeks  What is the primary reason for your visit?: Other      Subjective        Uyen Howell presents to [facility]  History of Present Illness  Overall doing okay. Final semester of law school. Med continues to help w/o side effects. No trouble finding med. Dose working well. Doesn't take on weekends.      Objective   Vital Signs:  /74 (BP Location: Left arm, Patient Position: Sitting, Cuff Size: Adult)   Pulse 96   Temp 99 °F (37.2 °C) (Temporal)   Resp 16   Ht 162.6 cm (64\")   Wt 80 kg (176 lb 6.4 oz)   SpO2 96%   BMI 30.28 kg/m²   Estimated body mass index is 30.28 kg/m² as calculated from the following:    Height as of this encounter: 162.6 cm (64\").    Weight as of this encounter: 80 kg (176 lb 6.4 oz).             Physical Exam  Vitals and nursing note reviewed.   Constitutional:       General: She is not in acute distress.     Appearance: Normal appearance. She is well-developed and well-groomed. She is not ill-appearing, toxic-appearing or diaphoretic.      Interventions: Face mask in place.   HENT:      Head: Normocephalic and atraumatic.      Right Ear: Hearing normal.      Left Ear: Hearing normal.   Eyes:      General: Lids are normal. No scleral icterus.     Extraocular Movements: Extraocular movements intact.   Neck:      Trachea: Phonation normal.   Cardiovascular:      Rate and Rhythm: Normal rate and regular rhythm.   Pulmonary:      Effort: Pulmonary effort is normal.   Musculoskeletal:      Cervical back: Neck supple.   Skin:     Coloration: Skin is not jaundiced or pale.   Neurological:      General: No focal deficit present.      Mental Status: She is alert and oriented to person, " place, and time.      Motor: Motor function is intact.   Psychiatric:         Attention and Perception: Attention and perception normal.         Mood and Affect: Mood and affect normal.         Speech: Speech normal.         Behavior: Behavior normal. Behavior is cooperative.         Thought Content: Thought content normal.         Cognition and Memory: Cognition and memory normal.         Judgment: Judgment normal.        Result Review :  The following data was reviewed by: Rosa M Cornelius MD on 01/17/2023:  Urine Drug Screen - Urine, Clean Catch (05/23/2022 16:50)  Lab Results   Component Value Date    [id number] 258 01/27/2022    Liquid-based Pap Smear, Screening (06/18/2019)                 Assessment and Plan   Diagnoses and all orders for this visit:    1. Attention deficit (Primary)  -     amphetamine-dextroamphetamine (Adderall) 15 MG tablet; Take 1 tablet by mouth 2 (Two) Times a Day.  Dispense: 60 tablet; Refill: 0    2. Medication monitoring encounter  -     Lipid Panel  -     TSH+Free T4  -     Vitamin D,25-Hydroxy  -     Vitamin B12 Deficiency Cascade  -     CBC (No Diff)  -     Comprehensive Metabolic Panel  -     Folate    3. Low serum vitamin B12  -     Vitamin B12 Deficiency Cascade    4. Vitamin D deficiency  -     Vitamin D,25-Hydroxy    5. Abnormal blood chemistry  -     Lipid Panel  -     TSH+Free T4  -     Vitamin D,25-Hydroxy  -     Vitamin B12 Deficiency Cascade  -     CBC (No Diff)  -     Comprehensive Metabolic Panel  -     Folate    6. Screening, lipid  -     Lipid Panel      KARLEE reviewed and appropriate.         Follow Up   Return in about 3 months (around 4/17/2023) for Annual physical, Pap Smear.  Patient was given instructions and counseling regarding her condition or for health maintenance advice. Please see specific information pulled into the AVS if appropriate.

## 2023-01-18 LAB
25(OH)D3+25(OH)D2 SERPL-MCNC: 37.1 NG/ML (ref 30–100)
ALBUMIN SERPL-MCNC: 4.6 G/DL (ref 3.5–5.2)
ALBUMIN/GLOB SERPL: 1.8 G/DL
ALP SERPL-CCNC: 62 U/L (ref 39–117)
ALT SERPL-CCNC: 16 U/L (ref 1–33)
AST SERPL-CCNC: 15 U/L (ref 1–32)
BILIRUB SERPL-MCNC: 0.3 MG/DL (ref 0–1.2)
BUN SERPL-MCNC: 9 MG/DL (ref 6–20)
BUN/CREAT SERPL: 10.1 (ref 7–25)
CALCIUM SERPL-MCNC: 9.6 MG/DL (ref 8.6–10.5)
CHLORIDE SERPL-SCNC: 102 MMOL/L (ref 98–107)
CHOLEST SERPL-MCNC: 132 MG/DL (ref 0–200)
CO2 SERPL-SCNC: 25.9 MMOL/L (ref 22–29)
CREAT SERPL-MCNC: 0.89 MG/DL (ref 0.57–1)
EGFRCR SERPLBLD CKD-EPI 2021: 89.6 ML/MIN/1.73
ERYTHROCYTE [DISTWIDTH] IN BLOOD BY AUTOMATED COUNT: 12.3 % (ref 12.3–15.4)
FOLATE SERPL-MCNC: 2.86 NG/ML (ref 4.78–24.2)
GLOBULIN SER CALC-MCNC: 2.6 GM/DL
GLUCOSE SERPL-MCNC: 92 MG/DL (ref 65–99)
HCT VFR BLD AUTO: 41.6 % (ref 34–46.6)
HDLC SERPL-MCNC: 57 MG/DL (ref 40–60)
HGB BLD-MCNC: 14 G/DL (ref 12–15.9)
INTERPRETATION: NORMAL
LDLC SERPL CALC-MCNC: 63 MG/DL (ref 0–100)
MCH RBC QN AUTO: 30 PG (ref 26.6–33)
MCHC RBC AUTO-ENTMCNC: 33.7 G/DL (ref 31.5–35.7)
MCV RBC AUTO: 89.3 FL (ref 79–97)
PLATELET # BLD AUTO: 279 10*3/MM3 (ref 140–450)
POTASSIUM SERPL-SCNC: 4.2 MMOL/L (ref 3.5–5.2)
PROT SERPL-MCNC: 7.2 G/DL (ref 6–8.5)
RBC # BLD AUTO: 4.66 10*6/MM3 (ref 3.77–5.28)
SODIUM SERPL-SCNC: 137 MMOL/L (ref 136–145)
T4 FREE SERPL-MCNC: 1.16 NG/DL (ref 0.93–1.7)
TRIGL SERPL-MCNC: 53 MG/DL (ref 0–150)
TSH SERPL DL<=0.005 MIU/L-ACNC: 1.42 UIU/ML (ref 0.27–4.2)
VIT B12 SERPL-MCNC: 1719 PG/ML (ref 232–1245)
VLDLC SERPL CALC-MCNC: 12 MG/DL (ref 5–40)
WBC # BLD AUTO: 8.18 10*3/MM3 (ref 3.4–10.8)

## 2023-01-18 NOTE — PROGRESS NOTES
Vitamin B12 improved, absorbing well. Can decrease either frequency of supplement or dose of supplement.     Folic acid deficient. Symptoms can be similar to vitamin B12 deficiency: fatigue, brain fog, nerve pain, etc. I recommend a supplement called L-methylfolate 15 mg. Can get on amazon. May be better absorbed than a regular folic acid supplement.    Rest of labs are okay.

## 2023-07-30 ENCOUNTER — PATIENT MESSAGE (OUTPATIENT)
Dept: INTERNAL MEDICINE | Facility: CLINIC | Age: 33
End: 2023-07-30
Payer: COMMERCIAL

## 2023-07-31 RX ORDER — DROSPIRENONE AND ETHINYL ESTRADIOL 0.02-3(28)
1 KIT ORAL DAILY
Qty: 84 TABLET | Refills: 3 | Status: SHIPPED | OUTPATIENT
Start: 2023-07-31

## 2023-09-22 DIAGNOSIS — R41.840 ATTENTION DEFICIT: ICD-10-CM

## 2023-09-22 DIAGNOSIS — E55.9 VITAMIN D DEFICIENCY: ICD-10-CM

## 2023-09-22 RX ORDER — DEXTROAMPHETAMINE SACCHARATE, AMPHETAMINE ASPARTATE, DEXTROAMPHETAMINE SULFATE AND AMPHETAMINE SULFATE 3.75; 3.75; 3.75; 3.75 MG/1; MG/1; MG/1; MG/1
15 TABLET ORAL 2 TIMES DAILY
Qty: 60 TABLET | Refills: 0 | Status: SHIPPED | OUTPATIENT
Start: 2023-09-22

## 2023-09-22 NOTE — TELEPHONE ENCOUNTER
Rx Refill Note  Requested Prescriptions     Pending Prescriptions Disp Refills    Cholecalciferol 25 MCG (1000 UT) capsule 90 capsule 3     Sig: Take 1 capsule by mouth Daily.    amphetamine-dextroamphetamine (Adderall) 15 MG tablet 60 tablet 0     Sig: Take 1 tablet by mouth 2 (Two) Times a Day.      Last office visit with prescribing clinician: 7/3/2023   Last telemedicine visit with prescribing clinician: Visit date not found   Next office visit with prescribing clinician: 1/3/2024     Cary Pineda MA  09/22/23, 13:45 EDT    Last UDS: 7/3/2023

## 2023-10-19 RX ORDER — DROSPIRENONE AND ETHINYL ESTRADIOL 0.02-3(28)
1 KIT ORAL DAILY
Qty: 84 TABLET | Refills: 3 | Status: SHIPPED | OUTPATIENT
Start: 2023-10-19

## 2024-01-04 DIAGNOSIS — R41.840 ATTENTION DEFICIT: ICD-10-CM

## 2024-01-04 RX ORDER — DEXTROAMPHETAMINE SACCHARATE, AMPHETAMINE ASPARTATE, DEXTROAMPHETAMINE SULFATE AND AMPHETAMINE SULFATE 3.75; 3.75; 3.75; 3.75 MG/1; MG/1; MG/1; MG/1
15 TABLET ORAL 2 TIMES DAILY
Qty: 60 TABLET | Refills: 0 | Status: SHIPPED | OUTPATIENT
Start: 2024-01-04

## 2024-01-04 RX ORDER — DROSPIRENONE AND ETHINYL ESTRADIOL 0.02-3(28)
1 KIT ORAL DAILY
Qty: 84 TABLET | Refills: 3 | Status: SHIPPED | OUTPATIENT
Start: 2024-01-04

## 2024-01-10 ENCOUNTER — OFFICE VISIT (OUTPATIENT)
Dept: INTERNAL MEDICINE | Facility: CLINIC | Age: 34
End: 2024-01-10
Payer: COMMERCIAL

## 2024-01-10 VITALS
WEIGHT: 185.8 LBS | BODY MASS INDEX: 31.72 KG/M2 | OXYGEN SATURATION: 99 % | TEMPERATURE: 97.5 F | DIASTOLIC BLOOD PRESSURE: 72 MMHG | HEIGHT: 64 IN | SYSTOLIC BLOOD PRESSURE: 112 MMHG | HEART RATE: 73 BPM | RESPIRATION RATE: 16 BRPM

## 2024-01-10 DIAGNOSIS — Z30.41 VISIT FOR BIRTH CONTROL PILLS MAINTENANCE: ICD-10-CM

## 2024-01-10 DIAGNOSIS — R41.840 ATTENTION DEFICIT: Primary | ICD-10-CM

## 2024-01-10 DIAGNOSIS — Z28.21 INFLUENZA VACCINATION DECLINED: ICD-10-CM

## 2024-01-10 PROCEDURE — 99213 OFFICE O/P EST LOW 20 MIN: CPT | Performed by: FAMILY MEDICINE

## 2024-01-10 RX ORDER — DROSPIRENONE AND ETHINYL ESTRADIOL 0.02-3(28)
1 KIT ORAL DAILY
Qty: 84 TABLET | Refills: 3 | Status: SHIPPED | OUTPATIENT
Start: 2024-01-10

## 2024-01-10 NOTE — PROGRESS NOTES
"Chief Complaint  ADHD (Medication follow up)  Answers submitted by the patient for this visit:  Other (Submitted on 1/10/2024)  Please describe your symptoms.: This is just general check in appointment for medication  Have you had these symptoms before?: Yes  How long have you been having these symptoms?: 1-4 days  Primary Reason for Visit (Submitted on 1/10/2024)  What is the primary reason for your visit?: Other    Subjective        yUen Howell presents to [facility]  History of Present Illness  Doing well no acute complaints. Adderall picked up earlier this month. Continues to help. 30 day rx lasts longer than a month. Will let us know when needed. Politely declines flu shot. Needs oral contraceptive pill refilled for some reason pharmacy said they didn't have it.      Objective   Vital Signs:  /72 (BP Location: Left arm, Patient Position: Sitting, Cuff Size: Adult)   Pulse 73   Temp 97.5 °F (36.4 °C) (Temporal)   Resp 16   Ht 162.6 cm (64\")   Wt 84.3 kg (185 lb 12.8 oz)   SpO2 99%   BMI 31.89 kg/m²   Estimated body mass index is 31.89 kg/m² as calculated from the following:    Height as of this encounter: 162.6 cm (64\").    Weight as of this encounter: 84.3 kg (185 lb 12.8 oz).               Physical Exam  Vitals and nursing note reviewed.   Constitutional:       General: She is not in acute distress.     Appearance: Normal appearance. She is well-developed and well-groomed. She is obese. She is not ill-appearing, toxic-appearing or diaphoretic.   HENT:      Head: Normocephalic and atraumatic.      Right Ear: Hearing normal.      Left Ear: Hearing normal.   Eyes:      General: Lids are normal. No scleral icterus.     Extraocular Movements: Extraocular movements intact.   Neck:      Trachea: Phonation normal.   Cardiovascular:      Rate and Rhythm: Normal rate and regular rhythm.   Pulmonary:      Effort: Pulmonary effort is normal.   Musculoskeletal:      " Cervical back: Neck supple.   Skin:     Coloration: Skin is not jaundiced or pale.   Neurological:      General: No focal deficit present.      Mental Status: She is alert and oriented to person, place, and time.      Motor: Motor function is intact.   Psychiatric:         Attention and Perception: Attention and perception normal.         Mood and Affect: Mood and affect normal.         Speech: Speech normal.         Behavior: Behavior normal. Behavior is cooperative.         Thought Content: Thought content normal.         Cognition and Memory: Cognition and memory normal.         Judgment: Judgment normal.        Result Review :  The following data was reviewed by: Rosa M Cornelius MD on 01/10/2024:  Urine Drug Screen - Urine, Clean Catch (07/03/2023 14:50)   KARLEE reviewed and appropriate.             Assessment and Plan   Diagnoses and all orders for this visit:    1. Attention deficit (Primary)    2. Visit for birth control pills maintenance  -     drospirenone-ethinyl estradiol (Klaudia) 3-0.02 MG per tablet; Take 1 tablet by mouth Daily.  Dispense: 84 tablet; Refill: 3    3. Influenza vaccination declined      UDS next visit  Contact provider when refill needed on Adderall. Continues to show benefits with use w/o red flags.       Follow Up   Return in about 6 months (around 7/15/2024) for Annual physical, Controlled Rx Follow Up.  Patient was given instructions and counseling regarding her condition or for health maintenance advice. Please see specific information pulled into the AVS if appropriate.

## 2024-05-13 DIAGNOSIS — R41.840 ATTENTION DEFICIT: ICD-10-CM

## 2024-05-13 RX ORDER — DEXTROAMPHETAMINE SACCHARATE, AMPHETAMINE ASPARTATE, DEXTROAMPHETAMINE SULFATE AND AMPHETAMINE SULFATE 3.75; 3.75; 3.75; 3.75 MG/1; MG/1; MG/1; MG/1
15 TABLET ORAL 2 TIMES DAILY
Qty: 60 TABLET | Refills: 0 | Status: SHIPPED | OUTPATIENT
Start: 2024-05-13

## 2024-06-13 DIAGNOSIS — Z30.41 VISIT FOR BIRTH CONTROL PILLS MAINTENANCE: ICD-10-CM

## 2024-06-13 DIAGNOSIS — R41.840 ATTENTION DEFICIT: ICD-10-CM

## 2024-06-13 RX ORDER — DROSPIRENONE AND ETHINYL ESTRADIOL 0.02-3(28)
1 KIT ORAL DAILY
Qty: 84 TABLET | Refills: 3 | OUTPATIENT
Start: 2024-06-13

## 2024-06-13 RX ORDER — DEXTROAMPHETAMINE SACCHARATE, AMPHETAMINE ASPARTATE, DEXTROAMPHETAMINE SULFATE AND AMPHETAMINE SULFATE 3.75; 3.75; 3.75; 3.75 MG/1; MG/1; MG/1; MG/1
15 TABLET ORAL 2 TIMES DAILY
Qty: 60 TABLET | Refills: 0 | Status: SHIPPED | OUTPATIENT
Start: 2024-06-13

## 2024-06-13 NOTE — TELEPHONE ENCOUNTER
Rx Refill Note  Requested Prescriptions     Pending Prescriptions Disp Refills    amphetamine-dextroamphetamine (Adderall) 15 MG tablet 60 tablet 0     Sig: Take 1 tablet by mouth 2 (Two) Times a Day.     Refused Prescriptions Disp Refills    drospirenone-ethinyl estradiol (Klaudia) 3-0.02 MG per tablet 84 tablet 3     Sig: Take 1 tablet by mouth Daily.      Last office visit with prescribing clinician: 1/10/2024   Next office visit with prescribing clinician: 7/10/2024     Miguelina Crowell MA  06/13/24, 09:51 EDT    UDS: 07/03/2023

## 2024-06-16 DIAGNOSIS — Z30.41 VISIT FOR BIRTH CONTROL PILLS MAINTENANCE: ICD-10-CM

## 2024-06-17 RX ORDER — DROSPIRENONE AND ETHINYL ESTRADIOL 0.02-3(28)
1 KIT ORAL DAILY
Qty: 84 TABLET | Refills: 3 | Status: SHIPPED | OUTPATIENT
Start: 2024-06-17

## 2024-06-17 NOTE — TELEPHONE ENCOUNTER
Rx Refill Note  Requested Prescriptions     Pending Prescriptions Disp Refills    drospirenone-ethinyl estradiol (Klaudia) 3-0.02 MG per tablet 84 tablet 3     Sig: Take 1 tablet by mouth Daily.      Last office visit with prescribing clinician: 1/10/2024   Next office visit with prescribing clinician: 7/10/2024       Miguelina Crowell MA  06/17/24, 09:13 EDT

## 2024-08-31 DIAGNOSIS — Z30.41 VISIT FOR BIRTH CONTROL PILLS MAINTENANCE: ICD-10-CM

## 2024-09-03 RX ORDER — DROSPIRENONE AND ETHINYL ESTRADIOL 0.02-3(28)
1 KIT ORAL DAILY
Qty: 84 TABLET | Refills: 3 | Status: SHIPPED | OUTPATIENT
Start: 2024-09-03

## 2024-09-26 DIAGNOSIS — R41.840 ATTENTION DEFICIT: ICD-10-CM

## 2024-09-26 RX ORDER — DEXTROAMPHETAMINE SACCHARATE, AMPHETAMINE ASPARTATE, DEXTROAMPHETAMINE SULFATE AND AMPHETAMINE SULFATE 3.75; 3.75; 3.75; 3.75 MG/1; MG/1; MG/1; MG/1
15 TABLET ORAL 2 TIMES DAILY
Qty: 60 TABLET | Refills: 0 | Status: SHIPPED | OUTPATIENT
Start: 2024-09-26

## 2024-09-29 DIAGNOSIS — E55.9 VITAMIN D DEFICIENCY: ICD-10-CM

## 2024-09-30 RX ORDER — FEXOFENADINE HCL 180 MG
1000 TABLET ORAL DAILY
Qty: 90 CAPSULE | Refills: 3 | Status: SHIPPED | OUTPATIENT
Start: 2024-09-30

## 2024-10-29 ENCOUNTER — OFFICE VISIT (OUTPATIENT)
Dept: INTERNAL MEDICINE | Facility: CLINIC | Age: 34
End: 2024-10-29
Payer: COMMERCIAL

## 2024-10-29 VITALS
SYSTOLIC BLOOD PRESSURE: 112 MMHG | WEIGHT: 186.6 LBS | TEMPERATURE: 98.6 F | BODY MASS INDEX: 31.86 KG/M2 | DIASTOLIC BLOOD PRESSURE: 72 MMHG | HEIGHT: 64 IN

## 2024-10-29 DIAGNOSIS — E66.811 CLASS 1 OBESITY WITHOUT SERIOUS COMORBIDITY WITH BODY MASS INDEX (BMI) OF 32.0 TO 32.9 IN ADULT, UNSPECIFIED OBESITY TYPE: ICD-10-CM

## 2024-10-29 DIAGNOSIS — R06.81 WITNESSED APNEIC SPELLS: ICD-10-CM

## 2024-10-29 DIAGNOSIS — R41.840 ATTENTION DEFICIT: ICD-10-CM

## 2024-10-29 DIAGNOSIS — Z00.00 ANNUAL PHYSICAL EXAM: Primary | ICD-10-CM

## 2024-10-29 DIAGNOSIS — Z28.21 INFLUENZA VACCINATION DECLINED: ICD-10-CM

## 2024-10-29 DIAGNOSIS — R06.83 SNORING: ICD-10-CM

## 2024-10-29 DIAGNOSIS — Z51.81 MEDICATION MONITORING ENCOUNTER: ICD-10-CM

## 2024-10-29 DIAGNOSIS — Z30.41 VISIT FOR BIRTH CONTROL PILLS MAINTENANCE: ICD-10-CM

## 2024-10-29 PROCEDURE — 99395 PREV VISIT EST AGE 18-39: CPT | Performed by: FAMILY MEDICINE

## 2024-10-29 RX ORDER — DROSPIRENONE AND ETHINYL ESTRADIOL 0.02-3(28)
1 KIT ORAL DAILY
Qty: 84 TABLET | Refills: 3 | Status: SHIPPED | OUTPATIENT
Start: 2024-10-29

## 2024-10-29 RX ORDER — DEXTROAMPHETAMINE SACCHARATE, AMPHETAMINE ASPARTATE, DEXTROAMPHETAMINE SULFATE AND AMPHETAMINE SULFATE 3.75; 3.75; 3.75; 3.75 MG/1; MG/1; MG/1; MG/1
15 TABLET ORAL 2 TIMES DAILY
Qty: 60 TABLET | Refills: 0 | Status: SHIPPED | OUTPATIENT
Start: 2024-10-29

## 2024-10-29 NOTE — ASSESSMENT & PLAN NOTE
No red flags, continues to show benefits with use.  KARLEE reviewed and appropriate.  UDS updated  Discussed upcoming time out of office, went ahead and got refill sent so she'll have it when needed.  Return to clinic 6 months.

## 2024-10-29 NOTE — PROGRESS NOTES
"10/29/2024  Chief Complaint   Patient presents with    Annual Exam    Snoring       Patient Care Team:  Rosa M Cornelius MD as PCP - General (Family Medicine)]       Uyen Howell is here for her annual preventive exam. History per MA reviewed.     Has always snored but partner has told her she is having pauses in breath. Has never felt refreshed when she wakes up.    ADHD med continues to help  Doesn't take on weekends  Often forgets second dose  Aware of shortages on XR form    Cooking more, doing [employer]  Working out 2-3x a week  Enjoys hiking    Pap up to date  Declines tetanus shot today--discussed due if sustains injury  Declines flu shot  Our office doesn't have covid shots  Does not feel need for blood work and she is not on medicines that require lab monitoring  Eye/dental exams up to date.    Needs oral contraceptive pill refill           Health Maintenance   Topic Date Due    ANNUAL PHYSICAL  07/03/2024    Pneumococcal Vaccine 0-64 (1 of 2 - PCV) 10/29/2024 (Originally 10/13/1998)    TDAP/TD VACCINES (1 - Tdap) 01/10/2025 (Originally 10/13/2011)    COVID-19 Vaccine (3 - 2023-24 season) 01/18/2025 (Originally 9/1/2024)    INFLUENZA VACCINE  03/31/2025 (Originally 8/1/2024)    BMI FOLLOWUP  10/29/2025    PAP SMEAR  07/03/2028    HEPATITIS C SCREENING  Completed       Immunization History   Administered Date(s) Administered    COVID-19 (PFIZER) Purple Cap Monovalent 04/01/2021, 04/22/2021    Flu Vaccine Quad PF >36MO 10/28/2020         The following portions of the patient's history were reviewed and updated as appropriate: allergies, current medications, past family history, past medical history, past social history, past surgical history and problem list.    Objective   Visit Vitals  /72   Temp 98.6 °F (37 °C)   Ht 162.6 cm (64\")   Wt 84.6 kg (186 lb 9.6 oz)   BMI 32.03 kg/m²              Physical Exam  Vitals and nursing note reviewed.   Constitutional:       General: She is not in " acute distress.     Appearance: Normal appearance. She is well-developed and well-groomed. She is obese. She is not ill-appearing, toxic-appearing or diaphoretic.   HENT:      Head: Normocephalic and atraumatic. Hair is normal.      Right Ear: Hearing, tympanic membrane, ear canal and external ear normal.      Left Ear: Hearing, tympanic membrane, ear canal and external ear normal.      Nose: Nose normal.      Mouth/Throat:      Lips: Pink. No lesions.      Mouth: Mucous membranes are moist.      Dentition: Normal dentition.      Palate: No mass and lesions.      Pharynx: Uvula midline. Posterior oropharyngeal erythema present. No oropharyngeal exudate.      Comments: Crowded OP  Eyes:      General: Lids are normal. Gaze aligned appropriately. No scleral icterus.        Right eye: No discharge.         Left eye: No discharge.      Extraocular Movements: Extraocular movements intact.      Conjunctiva/sclera: Conjunctivae normal.      Pupils: Pupils are equal, round, and reactive to light.   Neck:      Thyroid: No thyromegaly.      Trachea: Trachea and phonation normal. No tracheal deviation.   Cardiovascular:      Rate and Rhythm: Normal rate and regular rhythm.      Heart sounds: Normal heart sounds. No murmur heard.     No friction rub. No gallop.   Pulmonary:      Effort: Pulmonary effort is normal.      Breath sounds: Normal breath sounds and air entry.   Abdominal:      General: Bowel sounds are normal. There is no distension.      Palpations: Abdomen is soft. Abdomen is not rigid. There is no mass.      Tenderness: There is no abdominal tenderness. There is no guarding or rebound.   Musculoskeletal:         General: No tenderness or deformity.      Cervical back: Neck supple.      Right lower leg: No edema.      Left lower leg: No edema.   Skin:     General: Skin is warm.      Capillary Refill: Capillary refill takes less than 2 seconds.      Coloration: Skin is not cyanotic, jaundiced or pale.      Findings:  No erythema or rash.      Nails: There is no clubbing.   Neurological:      General: No focal deficit present.      Mental Status: She is alert and oriented to person, place, and time.      Cranial Nerves: No cranial nerve deficit.      Motor: No tremor, atrophy, abnormal muscle tone or seizure activity.      Gait: Gait is intact. Gait normal.   Psychiatric:         Attention and Perception: Attention and perception normal.         Mood and Affect: Mood and affect normal.         Speech: Speech normal.         Behavior: Behavior normal. Behavior is cooperative.         Thought Content: Thought content normal.         Cognition and Memory: Cognition and memory normal.         Judgment: Judgment normal.         Lab Results   Component Value Date    CHLPL 132 01/17/2023    TRIG 53 01/17/2023    HDL 57 01/17/2023    LDL 63 01/17/2023     Lab Results   Component Value Date    TSH 1.420 01/17/2023     Urine Drug Screen - Urine, Clean Catch (07/03/2023 14:50)     LIQUID-BASED PAP SMEAR WITH HPV GENOTYPING REGARDLESS OF INTERPRETATION (HANNAH,COR,MAD) (07/03/2023 15:04)       Assessment   Diagnoses and all orders for this visit:    1. Annual physical exam (Primary)    2. Attention deficit  Assessment & Plan:  No red flags, continues to show benefits with use.  KARLEE reviewed and appropriate.  UDS updated  Discussed upcoming time out of office, went ahead and got refill sent so she'll have it when needed.  Return to clinic 6 months.    Orders:  -     amphetamine-dextroamphetamine (Adderall) 15 MG tablet; Take 1 tablet by mouth 2 (Two) Times a Day.  Dispense: 60 tablet; Refill: 0    3. Medication monitoring encounter  -     COMPLIANCE DRUG ANALYSIS, NO THC -    4. Visit for birth control pills maintenance  -     drospirenone-ethinyl estradiol (Klaudia) 3-0.02 MG per tablet; Take 1 tablet by mouth Daily.  Dispense: 84 tablet; Refill: 3    5. Snoring  -     Ambulatory Referral to Sleep Medicine    6. Witnessed apneic spells  -      Ambulatory Referral to Sleep Medicine    7. Class 1 obesity without serious comorbidity with body mass index (BMI) of 32.0 to 32.9 in adult, unspecified obesity type  Comments:  encouraged adding weight training, resistance (tone muscles), core exercises to aerobics    8. Influenza vaccination declined         Health maintenance information provided via patient plan (after visit summary).   Counseled on age appropriate health screenings and immunizations.  Encouraged exercise and healthy diet.    BMI is >= 30 and <35. (Class 1 Obesity). The following options were offered after discussion;: weight loss educational material (shared in after visit summary) and exercise counseling/recommendations    Return in about 6 months (around 4/29/2025) for Controlled Rx Follow Up.     Rosa M Cornelius MD

## 2024-11-08 LAB — DRUGS UR: NORMAL

## 2024-12-18 DIAGNOSIS — Z30.41 VISIT FOR BIRTH CONTROL PILLS MAINTENANCE: ICD-10-CM

## 2024-12-18 RX ORDER — DROSPIRENONE AND ETHINYL ESTRADIOL 0.02-3(28)
1 KIT ORAL DAILY
Qty: 84 TABLET | Refills: 3 | Status: CANCELLED | OUTPATIENT
Start: 2024-12-18

## 2025-01-23 ENCOUNTER — OFFICE VISIT (OUTPATIENT)
Dept: SLEEP MEDICINE | Age: 35
End: 2025-01-23
Payer: COMMERCIAL

## 2025-01-23 VITALS
WEIGHT: 191 LBS | HEART RATE: 76 BPM | TEMPERATURE: 97.8 F | BODY MASS INDEX: 32.61 KG/M2 | SYSTOLIC BLOOD PRESSURE: 112 MMHG | DIASTOLIC BLOOD PRESSURE: 74 MMHG | HEIGHT: 64 IN | OXYGEN SATURATION: 97 %

## 2025-01-23 DIAGNOSIS — G47.33 OBSTRUCTIVE SLEEP APNEA, ADULT: Primary | ICD-10-CM

## 2025-01-23 DIAGNOSIS — R06.83 SNORING: ICD-10-CM

## 2025-01-23 DIAGNOSIS — E66.9 OBESITY (BMI 30-39.9): ICD-10-CM

## 2025-01-23 DIAGNOSIS — G47.19 EXCESSIVE DAYTIME SLEEPINESS: ICD-10-CM

## 2025-01-23 NOTE — PROGRESS NOTES
"  Uyen Howell is a 34 y.o. female.   Chief Complaint   Patient presents with    New Patient    Snoring    Uncontrollable Need To Sleep    Non-restorative Sleep       HPI     34 y.o. female seen in consultation at the request of Rosa M Cornelius MD for evaluation of the above.     She describes being \"always tired\".  She thinks this has been present and probably worsening for about 5 years in total.    She notes that she has probably snored since high school.  She currently has been observed to have heavy snoring and apneas.  She occasionally wake with morning headaches and she has nocturnal reflux.    She keeps a regular sleep schedule and thinks she gets about 8 hours of sleep.  She is in bed for probably 9 hours and a bit longer on the weekends.  She recalls awakening an average of one-3 times per night on average.    She denies any RLS type symptoms.  Her only parasomnias are sleep talking and bruxism.  She denies any nocturnal hallucinations or sleep paralysis.  She has no symptoms to suggest cataplexy.    Sawyer Scale is: 4/24    The patient's relevant past medical, surgical, family, and social history reviewed and updated in Epic as appropriate.    Current medications are:   Current Outpatient Medications:     amphetamine-dextroamphetamine (Adderall) 15 MG tablet, Take 1 tablet by mouth 2 (Two) Times a Day., Disp: 60 tablet, Rfl: 0    [employer] D3 25 MCG (1000 UT) capsule, TAKE 1 CAPSULE BY MOUTH EVERY DAY, Disp: 90 capsule, Rfl: 3    drospirenone-ethinyl estradiol (Klaudia) 3-0.02 MG per tablet, Take 1 tablet by mouth Daily., Disp: 84 tablet, Rfl: 3.    Review of Systems    Review of Systems  ROS documented in patient questionnaire ×14 systems.  Reviewed with patient.  Otherwise negative except as noted in HPI.    Physical Exam    Blood pressure 112/74, pulse 76, temperature 97.8 °F (36.6 °C), temperature source Oral, height 162.6 cm (64\"), weight 86.6 kg (191 lb), SpO2 97%, not currently breastfeeding. " Body mass index is 32.79 kg/m².    Physical Exam  Vitals and nursing note reviewed.   Constitutional:       Appearance: Normal appearance. She is well-developed.   HENT:      Head: Normocephalic and atraumatic.      Nose: Nose normal.      Mouth/Throat:      Mouth: Mucous membranes are moist.      Pharynx: Oropharynx is clear. No oropharyngeal exudate.      Comments: Class IV airway  Eyes:      General: No scleral icterus.     Conjunctiva/sclera: Conjunctivae normal.   Neck:      Thyroid: No thyromegaly.      Trachea: No tracheal deviation.   Cardiovascular:      Rate and Rhythm: Normal rate and regular rhythm.      Heart sounds: No murmur heard.     No friction rub. No gallop.   Pulmonary:      Effort: Pulmonary effort is normal. No respiratory distress.      Breath sounds: No wheezing or rales.   Musculoskeletal:         General: No deformity. Normal range of motion.   Skin:     General: Skin is warm and dry.      Findings: No rash.   Neurological:      Mental Status: She is alert and oriented to person, place, and time.   Psychiatric:         Behavior: Behavior normal.         Thought Content: Thought content normal.       DATA:    Reviewed 10/29/2024 note from Dr. Cornelius    Reviewed bed partner questionnaire    ASSESSMENT:    Problem List Items Addressed This Visit    None  Visit Diagnoses       Obstructive sleep apnea, adult    -  Primary    Relevant Orders    Home Sleep Study    Snoring        Relevant Orders    Home Sleep Study    Excessive daytime sleepiness        Relevant Orders    Home Sleep Study    Obesity (BMI 30-39.9)                34-year-old female referred to the sleep center by Dr. Acevedo.  She presents with daytime somnolence, snoring, witnessed apneas, morning headaches, and nocturnal reflux.  I suspect the presence of obstructive sleep apnea and her class IV airway and elevated BMI put her at risk morphologically for the presence of sleep disordered breathing.    I recommended proceeding with  a diagnostic workup initially targeted at the likely diagnosis of sleep apnea.  This could be in the form of a home sleep apnea test.  If that is negative or nondiagnostic then we could proceed with a more formal PSG.  I discussed the likely diagnosis with her as well as the diagnostic process and treatment options in some detail.    PLAN:    - Home sleep apnea testing.  - Diagnostic process and potential treatment options discussed and questions/concerns addressed.  - Long-term cardiovascular and metabolic risks of untreated obstructive sleep apnea reviewed with the patient.  - The importance of long-term healthy weight loss discussed.  - Patient was agreeable to a trial of CPAP therapy on an initial trial basis if recommended after testing complete.  - Sleep center follow-up.    I have reviewed the results of my evaluation and impression and discussed my recommendations in detail with the patient.    Signed by  Matt Skelton MD    January 23, 2025      CC: Rosa M Cornelius MD Culver, Tabitha Dawn, MD

## 2025-02-03 DIAGNOSIS — R41.840 ATTENTION DEFICIT: ICD-10-CM

## 2025-02-03 RX ORDER — DEXTROAMPHETAMINE SACCHARATE, AMPHETAMINE ASPARTATE, DEXTROAMPHETAMINE SULFATE AND AMPHETAMINE SULFATE 3.75; 3.75; 3.75; 3.75 MG/1; MG/1; MG/1; MG/1
15 TABLET ORAL 2 TIMES DAILY
Qty: 60 TABLET | Refills: 0 | Status: SHIPPED | OUTPATIENT
Start: 2025-02-03

## 2025-02-03 NOTE — TELEPHONE ENCOUNTER
Rx Refill Note  Requested Prescriptions     Pending Prescriptions Disp Refills    amphetamine-dextroamphetamine (Adderall) 15 MG tablet 60 tablet 0     Sig: Take 1 tablet by mouth 2 (Two) Times a Day.      Last office visit with prescribing clinician: 10/29/2024   Last telemedicine visit with prescribing clinician: Visit date not found   Next office visit with prescribing clinician: 4/29/2025   {  Last UDS-10/29/24.

## 2025-03-04 DIAGNOSIS — Z30.41 VISIT FOR BIRTH CONTROL PILLS MAINTENANCE: ICD-10-CM

## 2025-03-04 RX ORDER — DROSPIRENONE AND ETHINYL ESTRADIOL 0.02-3(28)
1 KIT ORAL DAILY
Qty: 84 TABLET | Refills: 3 | Status: SHIPPED | OUTPATIENT
Start: 2025-03-04

## 2025-03-11 ENCOUNTER — HOSPITAL ENCOUNTER (OUTPATIENT)
Dept: SLEEP MEDICINE | Facility: HOSPITAL | Age: 35
Discharge: HOME | End: 2025-03-11
Admitting: INTERNAL MEDICINE
Payer: COMMERCIAL

## 2025-03-11 VITALS — BODY MASS INDEX: 32.59 KG/M2 | WEIGHT: 190.92 LBS | HEIGHT: 64 IN

## 2025-03-11 DIAGNOSIS — R06.83 SNORING: ICD-10-CM

## 2025-03-11 DIAGNOSIS — G47.33 OBSTRUCTIVE SLEEP APNEA, ADULT: ICD-10-CM

## 2025-03-11 DIAGNOSIS — G47.19 EXCESSIVE DAYTIME SLEEPINESS: ICD-10-CM

## 2025-03-11 PROCEDURE — 95800 SLP STDY UNATTENDED: CPT

## 2025-03-13 DIAGNOSIS — G47.19 EXCESSIVE DAYTIME SLEEPINESS: ICD-10-CM

## 2025-03-13 DIAGNOSIS — G47.33 OBSTRUCTIVE SLEEP APNEA, ADULT: Primary | ICD-10-CM

## 2025-03-27 ENCOUNTER — PATIENT MESSAGE (OUTPATIENT)
Dept: SLEEP MEDICINE | Age: 35
End: 2025-03-27
Payer: COMMERCIAL

## 2025-04-29 ENCOUNTER — HOSPITAL ENCOUNTER (OUTPATIENT)
Dept: GENERAL RADIOLOGY | Facility: HOSPITAL | Age: 35
Discharge: HOME | End: 2025-04-29
Admitting: FAMILY MEDICINE
Payer: COMMERCIAL

## 2025-04-29 ENCOUNTER — OFFICE VISIT (OUTPATIENT)
Dept: INTERNAL MEDICINE | Facility: CLINIC | Age: 35
End: 2025-04-29
Payer: COMMERCIAL

## 2025-04-29 VITALS
BODY MASS INDEX: 33.6 KG/M2 | TEMPERATURE: 97.8 F | OXYGEN SATURATION: 95 % | HEIGHT: 64 IN | SYSTOLIC BLOOD PRESSURE: 116 MMHG | WEIGHT: 196.8 LBS | DIASTOLIC BLOOD PRESSURE: 72 MMHG | HEART RATE: 66 BPM

## 2025-04-29 DIAGNOSIS — M25.562 CHRONIC PAIN OF LEFT KNEE: ICD-10-CM

## 2025-04-29 DIAGNOSIS — G89.29 CHRONIC PAIN OF LEFT KNEE: ICD-10-CM

## 2025-04-29 DIAGNOSIS — G47.33 OSA (OBSTRUCTIVE SLEEP APNEA): ICD-10-CM

## 2025-04-29 DIAGNOSIS — M25.552 LEFT HIP PAIN: ICD-10-CM

## 2025-04-29 DIAGNOSIS — R29.898 WEAKNESS OF LEFT LEG: ICD-10-CM

## 2025-04-29 DIAGNOSIS — R41.840 ATTENTION DEFICIT: Primary | ICD-10-CM

## 2025-04-29 PROCEDURE — 73521 X-RAY EXAM HIPS BI 2 VIEWS: CPT

## 2025-04-29 PROCEDURE — 99214 OFFICE O/P EST MOD 30 MIN: CPT | Performed by: FAMILY MEDICINE

## 2025-04-29 PROCEDURE — 1160F RVW MEDS BY RX/DR IN RCRD: CPT | Performed by: FAMILY MEDICINE

## 2025-04-29 PROCEDURE — 1159F MED LIST DOCD IN RCRD: CPT | Performed by: FAMILY MEDICINE

## 2025-04-29 PROCEDURE — 73562 X-RAY EXAM OF KNEE 3: CPT

## 2025-04-29 PROCEDURE — 1126F AMNT PAIN NOTED NONE PRSNT: CPT | Performed by: FAMILY MEDICINE

## 2025-04-29 RX ORDER — DEXTROAMPHETAMINE SACCHARATE, AMPHETAMINE ASPARTATE, DEXTROAMPHETAMINE SULFATE AND AMPHETAMINE SULFATE 3.75; 3.75; 3.75; 3.75 MG/1; MG/1; MG/1; MG/1
15 TABLET ORAL 2 TIMES DAILY
Qty: 60 TABLET | Refills: 0 | Status: SHIPPED | OUTPATIENT
Start: 2025-04-29

## 2025-04-29 NOTE — PROGRESS NOTES
"Chief Complaint  Hip Pain, Knee Pain (On left side), and ADHD    Subjective        Uyen Howell presents to [facility]  History of Present Illness  PATTI: new diagnosis. Trying to get used to. Needs pressured lowered, working on settings. No improvements in how she feels yet, work in progress.     ADHD: hasn't been taking medicine regularly, lost job. Routine \"all wonky\". No adverse effects when uses as needed.     Hip/knee issues:  Knee problem since high school. Was told had a torn meniscus, not confirmed. Injured knee in middle school playing basketball. Now having pain and weakness knee to hip left. Hip pain started about 5-10 years ago, but worse in last 1-2 years but especially worse last month. Mostly when sleeping (lays down knee and hip hurting) but now through day and has hard time lifting leg. Sometimes has to lift leg (thigh) to get in partner's truck. Has been going to gym more. No instability of knee. No catching/clicking of knee but feels some in hip. No low back pain other than in AM sometimes. No genital area numbness, no incontinence of urine/stool w/o awareness.       Follow up for medication. Also, I have been experiencing hip and knee pain  Symptoms are: recurrent.   Onset was 1 to 5 years.   Symptoms occur: daily.  Symptoms include: joint pain and myalgias.   Pertinent negative symptoms include no abdominal pain, no anorexia, no change in stool, no chest pain, no chills, no congestion, no cough, no diaphoresis, no fatigue, no fever, no headaches, no joint swelling, no nausea, no neck pain, no numbness, no rash, no sore throat, no swollen glands, no dysuria, no vertigo, no visual change, no vomiting and no weakness.   Treatment and/or Medications comments include: Stretching, ipuprofen, epsom salt baths       Objective   Vital Signs:  /72   Pulse 66   Temp 97.8 °F (36.6 °C)   Ht 162.6 cm (64.02\")   Wt 89.3 kg (196 lb 12.8 oz)   SpO2 95%   BMI 33.76 " "kg/m²   Estimated body mass index is 33.76 kg/m² as calculated from the following:    Height as of this encounter: 162.6 cm (64.02\").    Weight as of this encounter: 89.3 kg (196 lb 12.8 oz).          Physical Exam  Vitals and nursing note reviewed.   Constitutional:       General: She is not in acute distress.     Appearance: Normal appearance. She is well-developed and well-groomed. She is obese. She is not ill-appearing, toxic-appearing or diaphoretic.   HENT:      Head: Normocephalic and atraumatic.      Right Ear: Hearing normal.      Left Ear: Hearing normal.   Eyes:      General: Lids are normal. No scleral icterus.     Extraocular Movements: Extraocular movements intact.   Neck:      Trachea: Phonation normal.   Cardiovascular:      Rate and Rhythm: Normal rate and regular rhythm.   Pulmonary:      Effort: Pulmonary effort is normal.   Musculoskeletal:      Cervical back: Neck supple.      Left hip: No tenderness. Normal range of motion.      Left knee: No swelling or crepitus. Normal range of motion. No tenderness.   Skin:     Coloration: Skin is not jaundiced or pale.   Neurological:      General: No focal deficit present.      Mental Status: She is alert and oriented to person, place, and time.      Motor: Motor function is intact.   Psychiatric:         Attention and Perception: Attention and perception normal.         Mood and Affect: Mood and affect normal.         Speech: Speech normal.         Behavior: Behavior normal. Behavior is cooperative.         Thought Content: Thought content normal.         Cognition and Memory: Cognition and memory normal.         Judgment: Judgment normal.          Result Review :  The following data was reviewed by: Rosa M Cornelius MD on 04/16/2025:  Home Sleep Study (03/12/2025 06:55)   COMPLIANCE DRUG ANALYSIS, NO THC - (10/29/2024 10:38)            Assessment and Plan   Diagnoses and all orders for this visit:    1. Attention deficit (Primary)  -     " amphetamine-dextroamphetamine (Adderall) 15 MG tablet; Take 1 tablet by mouth 2 (Two) Times a Day.  Dispense: 60 tablet; Refill: 0    2. PATTI (obstructive sleep apnea)  Overview:  Home Sleep Study (03/12/2025 06:55) :  -  mild obstructive sleep apnea is present.  -  Snoring is present.     Recommendations:      - The patient would likely benefit from a trial of PAP therapy such as an AutoSet CPAP with an 8 cm minimum and 18 cm maximum.  - Recommend weight loss.  - Recommend the patient avoid alcohol and sedatives close to bedtime    Assessment & Plan:  Continue working with pulm on settings for PATTI treatment      3. Left hip pain  Assessment & Plan:  NSAIDs with food prn pain. Physical therapy ordered. May need further imaging of hip and/or knee (MRI) and/or orthopedic consult. If xrays today abnormal will have her see orthopedics, wait on physical therapy.    Orders:  -     XR Hips Bilateral With or Without Pelvis 2 View; Future  -     Ambulatory Referral to Physical Therapy for Evaluation & Treatment    4. Chronic pain of left knee  Assessment & Plan:  NSAIDs with food prn pain. Physical therapy ordered. May need further imaging of hip and/or knee (MRI) and/or orthopedic consult. If xrays today abnormal will have her see orthopedics, wait on physical therapy.    Orders:  -     XR Knee 3 View Left; Future  -     Ambulatory Referral to Physical Therapy for Evaluation & Treatment    5. Weakness of left leg  -     Ambulatory Referral to Physical Therapy for Evaluation & Treatment                  Follow Up   Return in about 6 months (around 10/30/2025) for Annual physical, Controlled Rx Follow Up.  Patient was given instructions and counseling regarding her condition or for health maintenance advice. Please see specific information pulled into the AVS if appropriate.

## 2025-04-29 NOTE — ASSESSMENT & PLAN NOTE
NSAIDs with food prn pain. Physical therapy ordered. May need further imaging of hip and/or knee (MRI) and/or orthopedic consult. If xrays today abnormal will have her see orthopedics, wait on physical therapy.

## 2025-05-05 ENCOUNTER — PATIENT MESSAGE (OUTPATIENT)
Dept: INTERNAL MEDICINE | Facility: CLINIC | Age: 35
End: 2025-05-05
Payer: COMMERCIAL

## 2025-05-06 ENCOUNTER — PRIOR AUTHORIZATION (OUTPATIENT)
Dept: INTERNAL MEDICINE | Facility: CLINIC | Age: 35
End: 2025-05-06
Payer: COMMERCIAL

## 2025-05-06 NOTE — TELEPHONE ENCOUNTER
Uyen Howell (Key: [id number])  PA Case ID #: [id number]  Rx #: [id number]  Need Help? Call us at [phone]  Status  sent iconSent to Plan today  Drug  Amphetamine-Dextroamphetamine 15MG tablets  ePA cloud logo  Form  [facility] [place] Medicaid ePA Form 2017 NCPDP  Original Claim Info

## 2025-05-07 NOTE — TELEPHONE ENCOUNTER
Outcome  Approved on May 6 by [place] [employer] 2017  The request has been approved. The authorization is effective from 05/06/2025 to 05/06/2026, as long as the member is enrolled in their current health plan. A written notification letter will follow with additional details.  Effective Date: 5/6/2025  Authorization Expiration Date: 5/6/2026

## 2025-05-14 ENCOUNTER — OFFICE VISIT (OUTPATIENT)
Dept: ORTHOPEDIC SURGERY | Facility: CLINIC | Age: 35
End: 2025-05-14
Payer: COMMERCIAL

## 2025-05-14 VITALS
DIASTOLIC BLOOD PRESSURE: 78 MMHG | SYSTOLIC BLOOD PRESSURE: 122 MMHG | WEIGHT: 197 LBS | HEIGHT: 64 IN | BODY MASS INDEX: 33.63 KG/M2

## 2025-05-14 DIAGNOSIS — M25.552 LEFT HIP PAIN: Primary | ICD-10-CM

## 2025-05-14 DIAGNOSIS — M16.12 PRIMARY OSTEOARTHRITIS OF LEFT HIP: ICD-10-CM

## 2025-05-14 PROCEDURE — 99204 OFFICE O/P NEW MOD 45 MIN: CPT | Performed by: PHYSICIAN ASSISTANT

## 2025-05-14 PROCEDURE — 1160F RVW MEDS BY RX/DR IN RCRD: CPT | Performed by: PHYSICIAN ASSISTANT

## 2025-05-14 PROCEDURE — 1159F MED LIST DOCD IN RCRD: CPT | Performed by: PHYSICIAN ASSISTANT

## 2025-05-14 RX ORDER — MELOXICAM 15 MG/1
15 TABLET ORAL DAILY
Qty: 30 TABLET | Refills: 1 | Status: SHIPPED | OUTPATIENT
Start: 2025-05-14

## 2025-05-14 NOTE — PROGRESS NOTES
Subjective   Patient ID: Uyen Howell is a 34 y.o. right hand dominant female   Pain of the Left Hip (Left hip pain for about 1-2 years, worse over the past 6 months. No injury to hip. She takes ibuprofen at bedtime, started outpatient PT yesterday at [facility].)         History of Present Illness  Patient presents as a new patient for the evaluation of chronic left anterior lateral hip pain ongoing for at least 2 years.  Patient mentions over the last 6 months the symptoms have worsened.  There has been no known injury or trauma to the hip.  She endorses taking ibuprofen at bedtime.  Her PCP recently ordered physical therapy which she began yesterday at [facility] hand and physical therapy.  There is no lower back pain.  Denies numbness or tingling.  She does endorse occasional left knee discomfort.  No known familial history of RA or OA.  No personal history of hip dysplasia's.  She denies any abnormality in her gait.                                                 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    TUMOR REMOVAL      skull    WISDOM TOOTH EXTRACTION         Family History   Problem Relation Age of Onset    Diabetes Father     Obesity Father     Sleep apnea Father     Arthritis Maternal Grandmother     Cancer Maternal Grandfather     Stroke Maternal Grandfather     COPD Maternal Grandfather     Sleep apnea Maternal Grandfather     Arthritis Paternal Grandmother     Diabetes Paternal Grandfather     Heart disease Paternal Grandfather     Anxiety disorder Brother     Sleep apnea Brother   "       Social History     Socioeconomic History    Marital status:    Tobacco Use    Smoking status: Some Days     Types: Electronic Cigarette     Passive exposure: Past    Smokeless tobacco: Never    Tobacco comments:     pt states she only smoke 2 a day   Vaping Use    Vaping status: Every Day    Substances: Nicotine    Devices: Disposable   Substance and Sexual Activity    Alcohol use: No    Drug use: No    Sexual activity: Yes     Partners: Male     Birth control/protection: Birth control pill       Allergies   Allergen Reactions    Biaxin [Clarithromycin] Swelling    Amoxicillin Rash       Review of Systems  See HPI    Objective   /78   Ht 162.6 cm (64.02\")   Wt 89.4 kg (197 lb)   BMI 33.79 kg/m²   Physical Exam  Vitals and nursing note reviewed.   Constitutional:       Appearance: Normal appearance.   Pulmonary:      Effort: Pulmonary effort is normal.   Musculoskeletal:      Left hip: Tenderness present. No deformity. Normal strength.      Left knee: No deformity, erythema or ecchymosis. No LCL laxity, MCL laxity or ACL laxity.  Neurological:      Mental Status: She is alert and oriented to person, place, and time.       Left Hip Exam     Tenderness   The patient is experiencing tenderness in the anterior and lateral.    Range of Motion   Abduction:  35   Flexion:  90   Internal rotation: 25     Muscle Strength   Abduction: 4/5   Adduction: 4/5   Flexion: 4/5     Comments:  + Stinchfield test            Extremity DVT signs are negative on physical exam with negative Kia sign, no calf pain, no palpable cords, and no skin tone change   Neurological Exam  Mental Status  Alert. Oriented to person, place, and time.    Motor                                               Right                     Left  Hip adduction                                                  4       Neg. SLR     Assessment & Plan   Independent Review of Radiographic Studies:    No new imaging done today.  Reviewed images and " agree with radiologist interpretation.    XR Hips Bilateral With or Without Pelvis 2 View Final result 4/29/2025             XR HIPS BILATERAL W OR WO PELVIS 2 VIEW, XR KNEE 3 VW LEFT    Date of Exam: 4/29/2025 8:48 AM EDT    Indication: hip and knee pain x months worsening    Comparison: None available.    Technique: 2 AP views of the pelvis with hips in internal and external rotation as well as AP and lateral views of the left knee were submitted for interpretation.    ...      Impression:    1. Degenerative changes of the left knee, advanced for age.    2.  Unremarkable views of the leftknee      Electronically Signed: Charlotte London MD   4/29/2025 7:03 PM EDT   Workstation ID: [id number]     Study Result    Narrative & Impression   XR HIPS BILATERAL W OR WO PELVIS 2 VIEW, XR KNEE 3 VW LEFT     Date of Exam: 4/29/2025 8:48 AM EDT     Indication: hip and knee pain x months worsening     Comparison: None available.     Technique: 2 AP views of the pelvis with hips in internal and external rotation as well as AP and lateral views of the left knee were submitted for interpretation.     Findings: No evidence of fracture or dislocation. Degenerative changes of the left hip characterized by joint space narrowing and sclerosis. No degenerative changes of the left knee..No suprapatellar joint effusion. No suspicious lytic or blastic bone   lesions. Surrounding soft tissues appear grossly unremarkable.           IMPRESSION:  Impression:     1. Degenerative changes of the left knee, advanced for age.     2.  Unremarkable views of the leftknee        Electronically Signed: Charlotte London MD    4/29/2025 7:03 PM EDT    Workstation ID: [id number]     I believe the interpretation should say degenerative changes of the left hip, advanced for her age.    Procedures      Differential diagnosis includes osteoarthritis, autoimmune arthropathy, Lyme disease secondary effect, labral degeneration versus tear, lupus related  arthritis    Diagnoses and all orders for this visit:    1. Left hip pain (Primary)  -     [facility] Large Joint  -     meloxicam (MOBIC) 15 MG tablet; Take 1 tablet by mouth Daily.  Dispense: 30 tablet; Refill: 1    2. Primary osteoarthritis of left hip  -     [place] [facility] Large Joint  -     meloxicam (MOBIC) 15 MG tablet; Take 1 tablet by mouth Daily.  Dispense: 30 tablet; Refill: 1       Discussion of orthopedic goals  Risk, benefits, and merits of treatment alternatives reviewed with the patient and questions answered  Ice, heat, and/or modalities as beneficial  Call or notify for any adverse effect from injection therapy  Physical therapy ongoing    Recommendations/Plan:  Patient is encouraged to call or return for any issues or concerns.  I would like for her to continue the formal physical therapy.  She mentions that she is currently working on losing weight.  We will try the fluoroscopy guided hip injection as well as the oral anti-inflammatories.  She may repeat the hip injection if this provides relief in the next 4 months.  If the pain were to return after the injection, I will order autoimmune lab panel.  Return in about 4 months (around 9/14/2025).  Patient agreeable to call or return sooner for any concerns.

## 2025-05-20 ENCOUNTER — PATIENT ROUNDING (BHMG ONLY) (OUTPATIENT)
Dept: ORTHOPEDIC SURGERY | Facility: CLINIC | Age: 35
End: 2025-05-20
Payer: COMMERCIAL

## 2025-05-20 DIAGNOSIS — Z30.41 VISIT FOR BIRTH CONTROL PILLS MAINTENANCE: ICD-10-CM

## 2025-05-20 NOTE — PROGRESS NOTES
"May 20, 2025    Hello, may I speak with Uyen Howell?    My name is Fani      I am  with [facility]  [address] [address] [phone].    Before we get started may I verify your date of birth? 1990    I am calling to officially welcome you to our practice and ask about your recent visit. Is this a good time to talk? yes    Tell me about your visit with us. What things went well?  \"everything went well\"       We're always looking for ways to make our patients' experiences even better. Do you have recommendations on ways we may improve?  no    Overall were you satisfied with your first visit to our practice? yes       I appreciate you taking the time to speak with me today. Is there anything else I can do for you? no      Thank you, and have a great day.      "

## 2025-05-21 RX ORDER — DROSPIRENONE AND ETHINYL ESTRADIOL 0.02-3(28)
1 KIT ORAL DAILY
Qty: 84 TABLET | Refills: 3 | OUTPATIENT
Start: 2025-05-21

## 2025-05-22 ENCOUNTER — HOSPITAL ENCOUNTER (OUTPATIENT)
Dept: GENERAL RADIOLOGY | Facility: HOSPITAL | Age: 35
Discharge: HOME | End: 2025-05-22
Payer: COMMERCIAL

## 2025-05-22 PROCEDURE — 25010000002 METHYLPREDNISOLONE PER 80 MG: Performed by: PHYSICIAN ASSISTANT

## 2025-05-22 PROCEDURE — 25010000002 LIDOCAINE 1 % SOLUTION: Performed by: PHYSICIAN ASSISTANT

## 2025-05-22 PROCEDURE — 77002 NEEDLE LOCALIZATION BY XRAY: CPT

## 2025-05-22 RX ORDER — LIDOCAINE HYDROCHLORIDE 10 MG/ML
5 INJECTION, SOLUTION INFILTRATION; PERINEURAL ONCE
Status: COMPLETED | OUTPATIENT
Start: 2025-05-22 | End: 2025-05-22

## 2025-05-22 RX ORDER — METHYLPREDNISOLONE ACETATE 80 MG/ML
80 INJECTION, SUSPENSION INTRA-ARTICULAR; INTRALESIONAL; INTRAMUSCULAR; SOFT TISSUE ONCE
Status: COMPLETED | OUTPATIENT
Start: 2025-05-22 | End: 2025-05-22

## 2025-05-22 RX ADMIN — METHYLPREDNISOLONE ACETATE 80 MG: 80 INJECTION, SUSPENSION INTRA-ARTICULAR; INTRALESIONAL; INTRAMUSCULAR; SOFT TISSUE at 13:40

## 2025-05-22 RX ADMIN — LIDOCAINE HYDROCHLORIDE 5 ML: 10 INJECTION, SOLUTION INFILTRATION; PERINEURAL at 13:39

## 2025-05-22 NOTE — POST-PROCEDURE NOTE
[facility]  [address], [address]  [place] [address]  [phone]        PROCEDURE REPORT        DIAGNOSIS:  Left hip osteoarthritis, symptomatic    PROCEDURE: Left hip injection under flouroscopy      Uyen Howell with date of birth 1990  presents to [facility] Department today for injection therapy.        Patient presents to [facility] Radiology Department Flouroscopy Suite on 5/22/2025 for planned elective left hip injection under flouroscopy for symptomatic osteoarthritis.    Procedure:     After consent was obtained, and using ethyl chloride topical local anesthetic, the left hip was then prepped and draped with sterile technique. With an anterior hip approach, flouroscopy guidance, and care to stay lateral of the femoral artery, the hip joint was entered via a 20 gauge spinal needle.  An image was saved of the needle within the hip joint space.  A mixture of 80 mg methylprednisolone in one ml plus 5 ml of 1% plain Lidocaine was injected and the needle withdrawn and a band aid applied. The procedure was well tolerated and without complication.  The patient did remain stable and with baseline ambulation. The patient is asked to avoid stressful physical activity for a day or two before resuming full regular activities.  There might be some soreness initially and patient to call for any adverse effect of the injection treatment.  Call or return to clinic if any fever, swelling, persistent pain, lack of anticipated improvement or other symptoms or concerns.    Impression: Symptomatic left hip osteoarthritis.      Recommendations/Plan:      Treatment and patient advice as noted here and in office visit report.  Orthopedic activities and goals reviewed and patient expressed appreciation.  Call or notify for any adverse effect from injection therapy.    Exercise: As tolerated.  No strenuous activity for a few days as appropriate.  Activity:  May perform usual  activities as tolerated.      Patient will return to our clinic at scheduled appointment.  Patient agreeable to call or return sooner for any concerns.